# Patient Record
Sex: MALE | Race: WHITE | NOT HISPANIC OR LATINO | Employment: FULL TIME | URBAN - METROPOLITAN AREA
[De-identification: names, ages, dates, MRNs, and addresses within clinical notes are randomized per-mention and may not be internally consistent; named-entity substitution may affect disease eponyms.]

---

## 2018-04-12 ENCOUNTER — OFFICE VISIT CONVERTED (OUTPATIENT)
Dept: ORTHOPEDIC SURGERY | Facility: CLINIC | Age: 43
End: 2018-04-12
Attending: ORTHOPAEDIC SURGERY

## 2018-04-19 ENCOUNTER — OFFICE VISIT CONVERTED (OUTPATIENT)
Dept: ORTHOPEDIC SURGERY | Facility: CLINIC | Age: 43
End: 2018-04-19
Attending: ORTHOPAEDIC SURGERY

## 2018-04-19 ENCOUNTER — CONVERSION ENCOUNTER (OUTPATIENT)
Dept: ORTHOPEDIC SURGERY | Facility: CLINIC | Age: 43
End: 2018-04-19

## 2018-04-25 ENCOUNTER — OFFICE VISIT CONVERTED (OUTPATIENT)
Dept: FAMILY MEDICINE CLINIC | Facility: CLINIC | Age: 43
End: 2018-04-25
Attending: NURSE PRACTITIONER

## 2018-05-08 ENCOUNTER — OFFICE VISIT CONVERTED (OUTPATIENT)
Dept: FAMILY MEDICINE CLINIC | Facility: CLINIC | Age: 43
End: 2018-05-08
Attending: FAMILY MEDICINE

## 2018-05-14 ENCOUNTER — OFFICE VISIT CONVERTED (OUTPATIENT)
Dept: FAMILY MEDICINE CLINIC | Facility: CLINIC | Age: 43
End: 2018-05-14
Attending: NURSE PRACTITIONER

## 2018-09-05 ENCOUNTER — OFFICE VISIT CONVERTED (OUTPATIENT)
Dept: FAMILY MEDICINE CLINIC | Facility: CLINIC | Age: 43
End: 2018-09-05
Attending: NURSE PRACTITIONER

## 2019-03-12 ENCOUNTER — HOSPITAL ENCOUNTER (OUTPATIENT)
Dept: OTHER | Facility: HOSPITAL | Age: 44
Discharge: HOME | End: 2019-03-12
Attending: PSYCHIATRY & NEUROLOGY

## 2019-03-12 LAB
ALBUMIN SERPL-MCNC: 4.4 G/DL (ref 3.5–5)
ALBUMIN/GLOB SERPL: 1.3 {RATIO} (ref 1.4–2.6)
ALP SERPL-CCNC: 116 U/L (ref 53–128)
ALT SERPL-CCNC: 18 U/L (ref 10–40)
ANION GAP SERPL CALC-SCNC: 17 MMOL/L (ref 8–19)
AST SERPL-CCNC: 16 U/L (ref 15–50)
BASOPHILS # BLD AUTO: 0.06 10*3/UL (ref 0–0.2)
BASOPHILS NFR BLD AUTO: 0.6 % (ref 0–3)
BILIRUB SERPL-MCNC: 0.22 MG/DL (ref 0.2–1.3)
BUN SERPL-MCNC: 11 MG/DL (ref 5–25)
BUN/CREAT SERPL: 11 {RATIO} (ref 6–20)
CALCIUM SERPL-MCNC: 10 MG/DL (ref 8.7–10.4)
CHLORIDE SERPL-SCNC: 107 MMOL/L (ref 99–111)
CONV ABS IMM GRAN: 0.02 10*3/UL (ref 0–0.2)
CONV CO2: 25 MMOL/L (ref 22–32)
CONV IMMATURE GRAN: 0.2 % (ref 0–1.8)
CONV TOTAL PROTEIN: 7.8 G/DL (ref 6.3–8.2)
CREAT UR-MCNC: 1.01 MG/DL (ref 0.7–1.2)
DEPRECATED RDW RBC AUTO: 45 FL (ref 35.1–43.9)
EOSINOPHIL # BLD AUTO: 0.28 10*3/UL (ref 0–0.7)
EOSINOPHIL # BLD AUTO: 2.9 % (ref 0–7)
ERYTHROCYTE [DISTWIDTH] IN BLOOD BY AUTOMATED COUNT: 13.2 % (ref 11.6–14.4)
GFR SERPLBLD BASED ON 1.73 SQ M-ARVRAT: >60 ML/MIN/{1.73_M2}
GLOBULIN UR ELPH-MCNC: 3.4 G/DL (ref 2–3.5)
GLUCOSE SERPL-MCNC: 85 MG/DL (ref 70–99)
HBA1C MFR BLD: 15.1 G/DL (ref 14–18)
HCT VFR BLD AUTO: 47.3 % (ref 42–52)
LYMPHOCYTES # BLD AUTO: 3.39 10*3/UL (ref 1–5)
MCH RBC QN AUTO: 29.8 PG (ref 27–31)
MCHC RBC AUTO-ENTMCNC: 31.9 G/DL (ref 33–37)
MCV RBC AUTO: 93.5 FL (ref 80–96)
MONOCYTES # BLD AUTO: 0.86 10*3/UL (ref 0.2–1.2)
MONOCYTES NFR BLD AUTO: 9 % (ref 3–10)
NEUTROPHILS # BLD AUTO: 4.99 10*3/UL (ref 2–8)
NEUTROPHILS NFR BLD AUTO: 52 % (ref 30–85)
NRBC CBCN: 0 % (ref 0–0.7)
OSMOLALITY SERPL CALC.SUM OF ELEC: 297 MOSM/KG (ref 273–304)
PLATELET # BLD AUTO: 390 10*3/UL (ref 130–400)
PMV BLD AUTO: 11.5 FL (ref 9.4–12.4)
POTASSIUM SERPL-SCNC: 5 MMOL/L (ref 3.5–5.3)
RBC # BLD AUTO: 5.06 10*6/UL (ref 4.7–6.1)
SODIUM SERPL-SCNC: 144 MMOL/L (ref 135–147)
VARIANT LYMPHS NFR BLD MANUAL: 35.3 % (ref 20–45)
WBC # BLD AUTO: 9.6 10*3/UL (ref 4.8–10.8)

## 2019-10-30 ENCOUNTER — OFFICE VISIT CONVERTED (OUTPATIENT)
Dept: FAMILY MEDICINE CLINIC | Facility: CLINIC | Age: 44
End: 2019-10-30
Attending: FAMILY MEDICINE

## 2019-12-27 ENCOUNTER — OFFICE VISIT CONVERTED (OUTPATIENT)
Dept: FAMILY MEDICINE CLINIC | Facility: CLINIC | Age: 44
End: 2019-12-27
Attending: NURSE PRACTITIONER

## 2019-12-27 ENCOUNTER — HOSPITAL ENCOUNTER (OUTPATIENT)
Dept: FAMILY MEDICINE CLINIC | Facility: CLINIC | Age: 44
Discharge: HOME | End: 2019-12-27
Attending: NURSE PRACTITIONER

## 2020-01-09 ENCOUNTER — HOSPITAL ENCOUNTER (OUTPATIENT)
Dept: PHYSICAL THERAPY | Facility: CLINIC | Age: 45
Setting detail: RECURRING SERIES
Discharge: HOME | End: 2020-02-05
Attending: NURSE PRACTITIONER

## 2020-01-21 ENCOUNTER — OFFICE VISIT CONVERTED (OUTPATIENT)
Dept: ORTHOPEDIC SURGERY | Facility: CLINIC | Age: 45
End: 2020-01-21
Attending: ORTHOPAEDIC SURGERY

## 2020-06-25 ENCOUNTER — OFFICE VISIT CONVERTED (OUTPATIENT)
Dept: FAMILY MEDICINE CLINIC | Facility: CLINIC | Age: 45
End: 2020-06-25
Attending: NURSE PRACTITIONER

## 2021-03-17 ENCOUNTER — HOSPITAL ENCOUNTER (OUTPATIENT)
Dept: OTHER | Facility: HOSPITAL | Age: 46
Discharge: HOME | End: 2021-03-17
Attending: PSYCHIATRY & NEUROLOGY

## 2021-03-17 LAB
ALBUMIN SERPL-MCNC: 4.4 G/DL (ref 3.5–5)
ALBUMIN/GLOB SERPL: 1.3 {RATIO} (ref 1.4–2.6)
ALP SERPL-CCNC: 109 U/L (ref 53–128)
ALT SERPL-CCNC: 26 U/L (ref 10–40)
ANION GAP SERPL CALC-SCNC: 14 MMOL/L (ref 8–19)
AST SERPL-CCNC: 22 U/L (ref 15–50)
BASOPHILS # BLD AUTO: 0.08 10*3/UL (ref 0–0.2)
BASOPHILS NFR BLD AUTO: 0.6 % (ref 0–3)
BILIRUB SERPL-MCNC: 0.2 MG/DL (ref 0.2–1.3)
BUN SERPL-MCNC: 12 MG/DL (ref 5–25)
BUN/CREAT SERPL: 15 {RATIO} (ref 6–20)
CALCIUM SERPL-MCNC: 9.5 MG/DL (ref 8.7–10.4)
CHLORIDE SERPL-SCNC: 104 MMOL/L (ref 99–111)
CONV ABS IMM GRAN: 0.03 10*3/UL (ref 0–0.2)
CONV CO2: 26 MMOL/L (ref 22–32)
CONV IMMATURE GRAN: 0.2 % (ref 0–1.8)
CONV TOTAL PROTEIN: 7.9 G/DL (ref 6.3–8.2)
CREAT UR-MCNC: 0.81 MG/DL (ref 0.7–1.2)
DEPRECATED RDW RBC AUTO: 41.4 FL (ref 35.1–43.9)
EOSINOPHIL # BLD AUTO: 0.17 10*3/UL (ref 0–0.7)
EOSINOPHIL # BLD AUTO: 1.4 % (ref 0–7)
ERYTHROCYTE [DISTWIDTH] IN BLOOD BY AUTOMATED COUNT: 12.7 % (ref 11.6–14.4)
GFR SERPLBLD BASED ON 1.73 SQ M-ARVRAT: >60 ML/MIN/{1.73_M2}
GLOBULIN UR ELPH-MCNC: 3.5 G/DL (ref 2–3.5)
GLUCOSE SERPL-MCNC: 82 MG/DL (ref 70–99)
HCT VFR BLD AUTO: 43.7 % (ref 42–52)
HGB BLD-MCNC: 14.9 G/DL (ref 14–18)
LYMPHOCYTES # BLD AUTO: 3.06 10*3/UL (ref 1–5)
LYMPHOCYTES NFR BLD AUTO: 24.4 % (ref 20–45)
MCH RBC QN AUTO: 30.1 PG (ref 27–31)
MCHC RBC AUTO-ENTMCNC: 34.1 G/DL (ref 33–37)
MCV RBC AUTO: 88.3 FL (ref 80–96)
MONOCYTES # BLD AUTO: 1 10*3/UL (ref 0.2–1.2)
MONOCYTES NFR BLD AUTO: 8 % (ref 3–10)
NEUTROPHILS # BLD AUTO: 8.22 10*3/UL (ref 2–8)
NEUTROPHILS NFR BLD AUTO: 65.4 % (ref 30–85)
NRBC CBCN: 0 % (ref 0–0.7)
OSMOLALITY SERPL CALC.SUM OF ELEC: 289 MOSM/KG (ref 273–304)
PLATELET # BLD AUTO: 343 10*3/UL (ref 130–400)
PMV BLD AUTO: 10.4 FL (ref 9.4–12.4)
POTASSIUM SERPL-SCNC: 3.8 MMOL/L (ref 3.5–5.3)
RBC # BLD AUTO: 4.95 10*6/UL (ref 4.7–6.1)
SODIUM SERPL-SCNC: 140 MMOL/L (ref 135–147)
WBC # BLD AUTO: 12.56 10*3/UL (ref 4.8–10.8)

## 2021-05-01 ENCOUNTER — OFFICE VISIT CONVERTED (OUTPATIENT)
Dept: FAMILY MEDICINE CLINIC | Facility: CLINIC | Age: 46
End: 2021-05-01
Attending: NURSE PRACTITIONER

## 2021-05-07 ENCOUNTER — CONVERSION ENCOUNTER (OUTPATIENT)
Dept: SURGERY | Facility: CLINIC | Age: 46
End: 2021-05-07

## 2021-05-07 ENCOUNTER — OFFICE VISIT CONVERTED (OUTPATIENT)
Dept: UROLOGY | Facility: CLINIC | Age: 46
End: 2021-05-07
Attending: UROLOGY

## 2021-05-07 LAB
BILIRUB UR QL STRIP: NEGATIVE
COLOR UR: YELLOW
CONV CLARITY OF URINE: CLEAR
CONV PROTEIN IN URINE BY AUTOMATED TEST STRIP: NEGATIVE
CONV UROBILINOGEN IN URINE BY AUTOMATED TEST STRIP: NORMAL
GLUCOSE UR QL: NEGATIVE
HGB UR QL STRIP: NEGATIVE
KETONES UR QL STRIP: NEGATIVE
LEUKOCYTE ESTERASE UR QL STRIP: NEGATIVE
NITRITE UR QL STRIP: NEGATIVE
PH UR STRIP.AUTO: 6 [PH]
SP GR UR: NORMAL

## 2021-05-07 NOTE — PROGRESS NOTES
Progress Note      Patient Name: Corky Gil   Patient ID: [id number]   Sex: Male   YOB: 1975    Referring Provider: Zoe HOPKINS    Visit Date: September 5, 2018    Provider: JW Cortez   Location: [facility]   Location Address: [address] [place] [address]  [id number]   Location Phone: [phone]          Chief Complaint     left eye, blood shot and draining, hurts and itches, started two days ago.       History Of Present Illness  Corky Gil is a 43 year old /White male who presents for evaluation and treatment of:      left eye is blood shot and a little itchy--it is watery--the eye started hurting 2 days ago--was not blood shot at the time of onset of pain, but then as the night progressed it did become blood shot--no blurred vision, double vision, or impaired vision--denies any foreign object to the eye--he does have glasses, but only for reading up close--    The only change that he can think of is that he started wearing a CPAP 4 weeks ago--he has had mild allergy symptoms--he does take OTC Zyrtec--he states he has been sneezing and his nose runs--but mainly in the mornings.       Past Medical History  Disease Name Date Onset Notes   Arthralgia --  --    Convulsive disorder --  --    Seizures --  --    Sleep apnea --  --          Past Surgical History  Procedure Name Date Notes   Vasectomy --  --          Medication List  Name Date Started Instructions   Tegretol 200 mg oral tablet  --    Zyrtec 10 mg oral tablet 04/25/2018 take 1 tablet (10 mg) by oral route once daily         Allergy List  Allergen Name Date Reaction Notes   Dilantin --  --  --    naproxen --  --  --          Family Medical History  Disease Name Relative/Age Notes   Arthrtis / Grandmother (maternal); Grandfather (maternal); Grandmother (paternal); Grandfather (paternal)    Grandfather (maternal)/     Grandfather (paternal)/     Grandmother (maternal)/      Grandmother (paternal)/    Family History Of Breast Cancer / Mother; Grandfather (maternal)    Grandfather (maternal)/     Mother/    Heart Disease / --    Seizures / Grandfather (paternal)    Grandfather (paternal)/          Social History  Finding Status Start/Stop Quantity Notes   Alcohol Former --/-- --  drank alcohol in the past, 8-14 drinks per week   Exercises regularly --  --/-- --  occasionally   Recreational Drug Use Former --/-- --  in the past   Second hand smoke exposure Unknown --/-- --  yes   Tobacco Former --/-- --  current every day smoker, smokes less than 1 pack per day, for 30 years  SMOKER A HALF A PACK TO A WHOLE PACK A DAY   Uses seatbelts --  --/-- --  no         Review of Systems  · Constitutional  o Admits  o : good general health lately  o Denies  o : fever, chills  · Eyes  o Admits  o : discharge from eye, eye discomfort--eye redness on the left  o Denies  o : double vision, impaired vision, blurred vision, changes in vision  · Cardiovascular  o Denies  o : chest pain, palpitations  · Respiratory  o Admits  o : sleep apnea--wears CPAP  o Denies  o : shortness of breath, cough  · Gastrointestinal  o Denies  o : nausea, vomiting  · Integument  o Denies  o : rash, pigmentation changes      Vitals  Date Time BP Position Site L\R Cuff Size HR RR TEMP(F) WT  HT  BMI kg/m2 BSA m2 O2 Sat HC       09/05/2018 09:05 /84 Sitting    80 - R  96.7 174lbs 5oz    97 %           Physical Examination  · Constitutional  o Appearance  o : well developed, well-nourished, in no acute distress  · Eyes  o Conjunctivae  o : subconjunctival hemorrhage present on the left lateral aspect of the eye--the medial aspect is normal. PERRLA  · Respiratory  o Respiratory Effort  o : breathing unlabored  o Auscultation of Lungs  o : clear to ascultation  · Cardiovascular  o Heart  o :   § Auscultation of Heart  § : regular rate and rhythm  o Peripheral Vascular System  o :   § Extremities  § : no  edema  · Musculoskeletal  o General  o :   § General Musculoskeletal  § : Muscle tone, strength, and development grossly normal.  · Skin and Subcutaneous Tissue  o General Inspection  o : no lesions present, no areas of discoloration, skin turgor normal, texture normal  · Neurologic  o Gait and Station  o :   § Gait Screening  § : normal gait  · Psychiatric  o Mood and Affect  o : mood normal, affect appropriate          Assessment  · Subconjunctival hemorrhage of left eye     372.72/H11.32      Plan  · Orders  o ACO-39: Current medications updated and reviewed () - - 09/05/2018  · Instructions  o Patient was educated/instructed on their diagnosis, treatment and medications prior to discharge from the clinic today. It appears he has a subconjunctival hemorrhage of the left eye of unknown etiology--he did start using a CPAP 4 weeks ago, and he is uncertain if this is contributing. He did experience acute eye pain prior to onset of hemorrhage. I advised him to contact his optometrist in [facility] and if he was unable to obtain an appointment there to try [facility] here in [facility] for further evaluation.             Electronically Signed by: JW Cortez -Author on September 5, 2018 09:26:59 AM

## 2021-05-07 NOTE — PROGRESS NOTES
Progress Note      Patient Name: Corky Gil   Patient ID: [id number]   Sex: Male   YOB: 1975    Referring Provider: Yadira Nance DO    Visit Date: October 30, 2019    Provider: Yadira Nance DO   Location: [facility]   Location Address: [address] [place] [address] [place]  [id number]   Location Phone: [phone]          Chief Complaint     Board of education physical       History Of Present Illness  Corky Gil is a 44 year old /White male who presents for evaluation and treatment of:      1.) Board of education physical : The patient presents regarding a board of education physical. Hx of epilepsy. He is controlled on Tegretol. He also presents for tuberculosis screening. No complaints of a cough, fevers, night sweats or dyspnea.       Past Medical History  Disease Name Date Onset Notes   Arthralgia --  --    Convulsive disorder --  --    Seizures --  --    Sleep apnea --  --          Past Surgical History  Procedure Name Date Notes   Vasectomy --  --          Medication List  Name Date Started Instructions   Tegretol 200 mg oral tablet  --          Allergy List  Allergen Name Date Reaction Notes   Dilantin --  --  --    naproxen --  --  --        Allergies Reconciled  Family Medical History  Disease Name Relative/Age Notes   Heart Disease  --    Arthrtis Grandfather (maternal)/  Grandfather (paternal)/  Grandmother (maternal)/  Grandmother (paternal)/   Grandmother (maternal); Grandfather (maternal); Grandmother (paternal); Grandfather (paternal)   Family History Of Breast Cancer Grandfather (maternal)/  Mother/   Mother; Grandfather (maternal)   Seizures Grandfather (paternal)/   Grandfather (paternal)         Social History  Finding Status Start/Stop Quantity Notes   Alcohol Former --/-- --  drank alcohol in the past, 8-14 drinks per week   Exercises regularly --  --/-- --  occasionally   Recreational Drug Use Former --/-- --  in the past   Second hand smoke  "exposure Unknown --/-- --  yes   Tobacco Current every day --/-- 1 1/2 PPD current every day smoker, smokes less than 1 pack per day, for 30 years  SMOKER A HALF A PACK TO A WHOLE PACK A DAY   Uses seatbelts --  --/-- --  no         Immunizations  NameDate Admin Mfg Trade Name Lot Number Route Inj VIS Given VIS Publication   HepA12/02/2018 NE Not Entered  NE NE 12/03/2018    Comments: given at [employer] [facility]   Isnaaxarz80/02/2018 NE Not Entered  NE NE 12/03/2018    Comments: given at [facility]         Review of Systems  · Constitutional  o Denies  o : fatigue, night sweats  · Eyes  o Denies  o : double vision, blurred vision  · HENT  o Denies  o : vertigo, recent head injury  · Cardiovascular  o Denies  o : chest pain, irregular heart beats  · Respiratory  o Denies  o : shortness of breath, productive cough  · Gastrointestinal  o Denies  o : nausea, vomiting  · Genitourinary  o Denies  o : dysuria, urinary retention  · Integument  o Denies  o : hair growth change, new skin lesions  · Neurologic  o Denies  o : altered mental status, seizures  · Musculoskeletal  o Denies  o : joint swelling, limitation of motion  · Endocrine  o Denies  o : cold intolerance, heat intolerance  · Heme-Lymph  o Denies  o : petechiae, lymph node enlargement or tenderness  · Allergic-Immunologic  o Denies  o : frequent illnesses      Vitals  Date Time BP Position Site L\R Cuff Size HR RR TEMP (F) WT  HT  BMI kg/m2 BSA m2 O2 Sat        10/30/2019 03:58 /90 Sitting    94 - R  97.8 168lbs 4oz 5'  4\" 28.88 1.86 98 %          Physical Examination  · Constitutional  o Appearance  o : well developed, well-nourished, in no acute distress  · Head and Face  o HEENT  o : Unremarkable  · Eyes  o Conjunctivae  o : conjunctivae normal  o Pupils and Irises  o : pupils equal and round, pupils reactive to light bilaterally, iris pigmentation normal  · Neck  o Inspection/Palpation  o : supple  o Thyroid  o : no thyromegaly  · Respiratory  o Respiratory " Effort  o : breathing unlabored  o Auscultation of Lungs  o : clear to ascultation  · Cardiovascular  o Heart  o :   § Auscultation of Heart  § : regular rate and rhythm  o Peripheral Vascular System  o :   § Extremities  § : no edema  · Lymphatic  o Neck  o : no lymphadenopathy present  · Musculoskeletal  o General  o :   § General Musculoskeletal  § : No joint swelling or deformity., Muscle tone, strength, and development grossly normal.  · Skin and Subcutaneous Tissue  o General Inspection  o : no lesions present, no areas of discoloration, skin turgor normal, texture normal  · Neurologic  o Mental Status Examination  o :   § Orientation  § : grossly oriented to person, place and time  § Speech/Language  § : communication ability within normal limits, voice quality normal, articulation of speech normal, no evidence of aphasia  o Cranial Nerves  o :   § Optic Nerve  § : vision intact bilaterally  § Oculomotor, Trochlear and Abducens Nerves  § : pupillary response to light brisk, eye movements within normal limits, no ptosis present  § Trigeminal Nerve  § : facial sensation normal bilaterally  § Facial Nerve  § : no facial weakness present  § Vestibuloacoustic Nerve  § : hearing intact bilaterally  § Spinal Accessory Nerve  § : shoulder shrug and sternocleidomastoid strength normal  § Hypoglossal Nerve  § : tongue movements normal  o Motor Examination  o :   § RUE Strength  § : strength normal  § RUE Motor Function  § : tone normal, muscle bulk normal, no abnormal movements  § LUE Strength  § : strength normal  § LUE Motor Function  § : tone normal, muscle bulk normal, no abnormal movements noted  § RLE Strength  § : strength normal  § RLE Motor Function  § : tone normal, no atrophy, no abnormal movements noted  § LLE Strength  § : strength normal  § LLE Motor Function  § : tone normal, no atrophy, no abnormal movements noted  o Reflexes  o :   § RLE  § : patellar reflex 2  § LLE  § : patellar reflex 2  o Gait and  Station  o :   § Gait Screening  § : normal gait  · Psychiatric  o Mood and Affect  o : mood normal, affect appropriate              Assessment  · Administrative encounter     V68.9/Z02.9    A.) Form completed with copy provided to patient.     · Tuberculosis screening     V74.1/Z11.1    A.) [facility] Tuberculosis (TB) Risk Assessment form completed and unremarkable; copy to patient.         Plan  · Orders  o ACO-39: Current medications updated and reviewed () - - 10/30/2019  · Medications  o Medications have been Reconciled  o Transition of Care or Provider Policy  · Instructions  o Patient was educated/instructed on their diagnosis, treatment and medications prior to discharge from the clinic today.  o Follow up as needed.             Electronically Signed by: Yadira Nance DO - on November 5, 2019 10:16:31 AM

## 2021-05-07 NOTE — PROGRESS NOTES
Progress Note      Patient Name: Corky Gil   Patient ID: [id number]   Sex: Male   YOB: 1975    Referring Provider: Zoe ESCALERA    Visit Date: June 25, 2020    Provider: JW Martin   Location: [facility]   Location Address: [address] [place] [address] [place]  [address]   Location Phone: [phone]          Chief Complaint     wants something to stop smoking       History Of Present Illness  Corky Gil is a 44 year old /White male who presents for evaluation and treatment of:      smoking cessation - pt states he has used patches in the past prescribed by JW Erwin - pt was able to stop for 4 months but his grandfather passed away and he started smoking again - he is currently smoking 1/2-1ppd x 2 years this time - pt works at [employer] and works as a  and climbing hills etc gets difficult    Pts wife is also planning to stop smoking       Past Medical History  Disease Name Date Onset Notes   Arthralgia --  --    Convulsive disorder --  --    Seizures --  --    Sleep apnea --  --          Past Surgical History  Procedure Name Date Notes   Vasectomy --  --          Medication List  Name Date Started Instructions   Tegretol 200 mg oral tablet  --          Allergy List  Allergen Name Date Reaction Notes   Dilantin --  --  --    naproxen --  --  --        Allergies Reconciled  Family Medical History  Disease Name Relative/Age Notes   Heart Disease  --    Arthrtis Grandfather (maternal)/  Grandfather (paternal)/  Grandmother (maternal)/  Grandmother (paternal)/   Grandmother (maternal); Grandfather (maternal); Grandmother (paternal); Grandfather (paternal)   Family History Of Breast Cancer Grandfather (maternal)/  Mother/   Mother; Grandfather (maternal)   Seizures Grandfather (paternal)/   Grandfather (paternal)         Social History  Finding Status Start/Stop Quantity Notes   Alcohol Former --/-- --  drank alcohol in the past,  "8-14 drinks per week   Exercises regularly --  --/-- --  occasionally   Recreational Drug Use Former --/-- --  in the past   Second hand smoke exposure Unknown --/-- --  yes   Tobacco Current every day --/-- 1 1/2 PPD current every day smoker, smokes less than 1 pack per day, for 30 years  SMOKER A HALF A PACK TO A WHOLE PACK A DAY   Uses seatbelts --  --/-- --  no         Immunizations  NameDate Admin Mfg Trade Name Lot Number Route Inj VIS Given VIS Publication   HepA12/02/2018 NE Not Entered  NE NE 12/03/2018    Comments: given at [employer]   Rvjvrodrx11/02/2018 NE Not Entered  NE NE 12/03/2018    Comments: given at [employer]         Review of Systems  · Cardiovascular  o Denies  o : chest pain, palpitations  · Respiratory  o Admits  o : shortness of breath  o Denies  o : wheezing, cough      Vitals  Date Time BP Position Site L\R Cuff Size HR RR TEMP (F) WT  HT  BMI kg/m2 BSA m2 O2 Sat        06/25/2020 03:27 /84 Sitting    102 - R  97.3 166lbs 16oz 5'  4\" 28.67 1.85 96 %          Physical Examination  · Constitutional  o Appearance  o : well developed, well-nourished, in no acute distress  · Eyes  o Conjunctivae  o : conjunctivae normal  o Pupils and Irises  o : pupils equal and round, pupils reactive to light bilaterally  · Neck  o Inspection/Palpation  o : supple  o Thyroid  o : no thyromegaly  · Respiratory  o Respiratory Effort  o : breathing unlabored  o Auscultation of Lungs  o : clear to ascultation  · Cardiovascular  o Heart  o :   § Auscultation of Heart  § : regular rate and rhythm  o Peripheral Vascular System  o :   § Extremities  § : no edema  · Lymphatic  o Neck  o : no lymphadenopathy present  · Musculoskeletal  o General  o :   § General Musculoskeletal  § : No joint swelling or deformity. Muscle tone, strength, and development grossly normal.  · Skin and Subcutaneous Tissue  o General Inspection  o : NL tone  · Neurologic  o Gait and Station  o :   § Gait Screening  § : normal " gait  · Psychiatric  o Mood and Affect  o : mood normal, affect appropriate          Assessment  · Tobacco abuse counseling       Tobacco abuse counseling     V65.42/Z71.6    Problems Reconciled  Plan  · Orders  o Smoking cessation counseling, 3-10 minutes [facility] ([id number]) - V65.42/Z71.6 - 06/25/2020  o ACO-17: Screened for tobacco use AND received tobacco cessation intervention (4004F) - V65.42/Z71.6 - 06/25/2020  o ACO-17: Screened for tobacco use AND received tobacco cessation intervention (4004F) - - 06/25/2020  o ACO-39: Current medications updated and reviewed () - - 06/25/2020  · Medications  o Nicoderm CQ 21 mg/24 hr transdermal patch 24 hour   SIG: apply 1 patch (21 mg) by transdermal route once daily for 4 weeks   DISP: (28) patches with 0 refills  Prescribed on 06/25/2020     o Nicoderm CQ 14 mg/24 hr transdermal patch 24 hour   SIG: apply 1 patch (14 mg) by transdermal route once daily for 2 weeks   DISP: (14) patches with 0 refills  Prescribed on 06/25/2020     o Nicoderm CQ 7 mg/24 hr transdermal patch 24 hour   SIG: apply 1 patch (7 mg) by transdermal route once daily for 2 weeks   DISP: (14) patches with 0 refills  Prescribed on 06/25/2020     o Medications have been Reconciled  o Transition of Care or Provider Policy  · Instructions  o Tobacco and smoking cessation counseling for more than 3 minutes was completed. Start use of patches, avoid smoking with patches. Start with 21mg and then decrease to 14mg then to 7mg as directed.   o Take all medications as prescribed/directed.  o Patient was educated/instructed on their diagnosis, treatment and medications prior to discharge from the clinic today.  · Disposition  o Follow up as needed.            Electronically Signed by: JW Martin -Author on June 25, 2020 04:20:23 PM

## 2021-05-07 NOTE — PROGRESS NOTES
Progress Note      Patient Name: Corky Gil   Patient ID: [id number]   Sex: Male   YOB: 1975    Referring Provider: Zoe HOPKINS    Visit Date: May 14, 2018    Provider: JW Cortez   Location: [facility]   Location Address: [address] [place] [address]  [id number]   Location Phone: [phone]          Chief Complaint     poison ivy       History Of Present Illness  Corky Gil is a 42 year old /White male who presents for evaluation and treatment of:      poison ivy--has had for a week or so--he has been using calamine lotion, but it is not improving--he is [employer], so he is outside a lot. He usually gets it once to twice per year. He is requesting a steroid shot.       Past Medical History  Disease Name Date Onset Notes   Arthralgia --  --    Convulsive disorder --  --    Seizures --  --    Sleep apnea --  --          Past Surgical History  Procedure Name Date Notes   Vasectomy --  --          Medication List  Name Date Started Instructions   hydrochlorothiazide 12.5 mg oral tablet 04/25/2018 take 1 tablet (12.5 mg) by oral route once daily PRN edema   nicotine 21 mg/24 hr transdermal patch 24 hour 04/25/2018 apply 1 patch (21 mg) by transdermal route once daily and remove at bedtime   Tegretol 200 mg oral tablet  --    Zyrtec 10 mg oral tablet 04/25/2018 take 1 tablet (10 mg) by oral route once daily         Allergy List  Allergen Name Date Reaction Notes   Dilantin --  --  --    naproxen --  --  --          Family Medical History  Disease Name Relative/Age Notes   Arthrtis / Grandmother (maternal); Grandfather (maternal); Grandmother (paternal); Grandfather (paternal)    Grandfather (maternal)/     Grandfather (paternal)/     Grandmother (maternal)/     Grandmother (paternal)/    Family History Of Breast Cancer / Mother; Grandfather (maternal)    Grandfather (maternal)/     Mother/    Heart Disease / --    Seizures /  Grandfather (paternal)    Grandfather (paternal)/          Social History  Finding Status Start/Stop Quantity Notes   Alcohol Former --/-- --  drank alcohol in the past, 8-14 drinks per week   Exercises regularly --  --/-- --  occasionally   Recreational Drug Use Former --/-- --  in the past   Second hand smoke exposure Unknown --/-- --  yes   Tobacco Former --/-- --  current every day smoker, smokes less than 1 pack per day, for 30 years  SMOKER A HALF A PACK TO A WHOLE PACK A DAY   Uses seatbelts --  --/-- --  no         Review of Systems  · Constitutional  o Admits  o : good general health lately  · Integument  o Admits  o : rash, itching      Vitals  Date Time BP Position Site L\R Cuff Size HR RR TEMP(F) WT  HT  BMI kg/m2 BSA m2 O2 Sat        05/14/2018 09:51 /90 Sitting    91 - R  97 173lbs 0oz    97 %           Physical Examination  · Constitutional  o Appearance  o : well developed, well-nourished, in no acute distress  · Respiratory  o Respiratory Effort  o : breathing unlabored  o Auscultation of Lungs  o : clear to ascultation  · Cardiovascular  o Heart  o :   § Auscultation of Heart  § : regular rate and rhythm  o Peripheral Vascular System  o :   § Extremities  § : no edema  · Musculoskeletal  o General  o :   § General Musculoskeletal  § : No obvious joint swelling or deformity. Muscle tone, strength, and development grossly normal.  · Skin and Subcutaneous Tissue  o General Inspection  o : on the right shin, just below the knee there are convalescing blisters, as well as on the right anterior and posterior forearm and upper arm--they are not weeping, and are in linear formation, especially on the right upper extremity  · Neurologic  o Gait and Station  o :   § Gait Screening  § : normal gait  · Psychiatric  o Mood and Affect  o : mood normal, affect appropriate              Assessment  · Poison ivy dermatitis     692.6/L23.7      Plan  · Orders  o ACO-39: Current medications updated and  reviewed () - - 05/14/2018  o Solu-Medrol Injection 40mg (-7) - 692.6/L23.7 - 05/14/2018   Injection - Solu-Medrol 40mg; Dose: 1mL; Site: Not Entered; Route: intramuscular; Date: 05/14/2018 10:56:36; Exp: 06/01/2019; Lot: [id number]; Mfg: [employer]; TradeName: methylprednisolone sod suc(PF); Location: [facility]; Administered By: Claudia Haq MA; Comment: ndc [id number]  o IM - Injection Fee [facility] ([id number]) - 692.6/L23.7 - 05/14/2018  · Medications  o triamcinolone acetonide 0.1 % topical cream   SIG: apply a thin layer to the affected area(s) by topical route 3 times per day as needed for itching   DISP: (1) 30 gm tube with 0 refills  Prescribed on 05/14/2018     · Instructions  o Patient was educated/instructed on their diagnosis, treatment and medications prior to discharge from the clinic today.  · Disposition  o Call or Return if symptoms worsen or persist.            Electronically Signed by: JW Cortez -Author on May 14, 2018 02:50:01 PM

## 2021-05-07 NOTE — PROGRESS NOTES
Progress Note      Patient Name: Corky Gil   Patient ID: [id number]   Sex: Male   YOB: 1975    Referring Provider: Zoe HOPKINS    Visit Date: May 8, 2018    Provider: Ernesto Lopez MD   Location: [facility], [place]   Location Address: [address] [place] [address]  [id number]   Location Phone: [phone]          Chief Complaint     patient here to f/u from labs       History Of Present Illness  Corky Gil is a 42 year old /White male who presents for evaluation and treatment of:      pt has been dieting and exercising  pt has quit smoking  discussed labs  hyperlipidemia- uncontrolled       Past Medical History  Disease Name Date Onset Notes   Arthralgia --  --    Convulsive disorder --  --    Seizures --  --    Sleep apnea --  --          Past Surgical History  Procedure Name Date Notes   Vasectomy --  --          Medication List  Name Date Started Instructions   hydrochlorothiazide 12.5 mg oral tablet 04/25/2018 take 1 tablet (12.5 mg) by oral route once daily PRN edema   nicotine 21 mg/24 hr transdermal patch 24 hour 04/25/2018 apply 1 patch (21 mg) by transdermal route once daily and remove at bedtime   Tegretol 200 mg oral tablet  --    Zyrtec 10 mg oral tablet 04/25/2018 take 1 tablet (10 mg) by oral route once daily         Allergy List  Allergen Name Date Reaction Notes   Dilantin --  --  --    naproxen --  --  --          Family Medical History  Disease Name Relative/Age Notes   Arthrtis / Grandmother (maternal); Grandfather (maternal); Grandmother (paternal); Grandfather (paternal)    Grandfather (maternal)/     Grandfather (paternal)/     Grandmother (maternal)/     Grandmother (paternal)/    Family History Of Breast Cancer / Mother; Grandfather (maternal)    Grandfather (maternal)/     Mother/    Heart Disease / --    Seizures / Grandfather (paternal)    Grandfather (paternal)/          Social History  Finding Status Start/Stop  Quantity Notes   Alcohol Former --/-- --  drank alcohol in the past, 8-14 drinks per week   Exercises regularly --  --/-- --  occasionally   Recreational Drug Use Former --/-- --  in the past   Second hand smoke exposure Unknown --/-- --  yes   Tobacco Former --/-- --  current every day smoker, smokes less than 1 pack per day, for 30 years  SMOKER A HALF A PACK TO A WHOLE PACK A DAY   Uses seatbelts --  --/-- --  no         Review of Systems  · Constitutional  o Denies  o : fatigue, fever  · Cardiovascular  o Denies  o : chest pain, palpitations  · Respiratory  o Denies  o : shortness of breath, cough  · Gastrointestinal  o Denies  o : nausea, vomiting, diarrhea  · Psychiatric  o Denies  o : anxiety, depression      Vitals  Date Time BP Position Site L\R Cuff Size HR RR TEMP(F) WT  HT  BMI kg/m2 BSA m2 O2 Sat HC       05/08/2018 12:33 /100 Sitting    95 - R  97.2 173lbs 0oz    96 %           Physical Examination  · Constitutional  o Appearance  o : well developed, well-nourished, in no acute distress  · Respiratory  o Respiratory Effort  o : breathing unlabored  o Auscultation of Lungs  o : clear to ascultation  · Cardiovascular  o Heart  o :   § Auscultation of Heart  § : regular rate and rhythm  · Gastrointestinal  o Abdomen  o : soft, non-tender, non-distended, + bowel sounds, no hepatosplenomegaly, no masses palpated  · Musculoskeletal  o General  o :   § General Musculoskeletal  § : No joint swelling or deformity., Muscle tone, strength, and development grossly normal.  · Neurologic  o Mental Status Examination  o :   § Orientation  § : grossly oriented to person, place and time  o Gait and Station  o :   § Gait Screening  § : normal gait              Assessment  · Essential hypertension     401.9/I10  · Hyperlipidemia     272.4/E78.5      Plan  · Orders  o ACO-39: Current medications updated and reviewed () - - 05/08/2018  · Medications  o amoxicillin 875 mg oral tablet   SIG: take 1 tablet (875  mg) by oral route every 12 hours for 10 days   DISP: (20) tablets with 0 refills  Discontinued on 05/08/2018     · Instructions  o Advised that cheeses and other sources of dairy fats, animal fats, fast food, and the extras (candy, pasteries, pies, doughnuts and cookies) all contain LDL raising nutrients. Advised to increase fruits, vegetables, whole grains, and to monitor portion sizes.   o Handouts were given to patient: high cholesterol.  o Take all medications as prescribed/directed.  o Patient was educated/instructed on their diagnosis, treatment and medications prior to discharge from the clinic today.  o will recheck lipids in 3 months.  o pt will continue to monitor BP and if not better in 1 week, will treat. Pt had more caffeine to day and he said he will not do that again.            Electronically Signed by: Ernesto Lopez MD -Author on May 8, 2018 01:09:16 PM

## 2021-05-07 NOTE — PROGRESS NOTES
Progress Note      Patient Name: Corky Gil   Patient ID: [id number]   Sex: Male   YOB: 1975    Referring Provider: Zoe HOPKINS    Visit Date: December 27, 2019    Provider: JW Martin   Location: [facility]   Location Address: [address] [place] [address] [place]  [id number]   Location Phone: [phone]          Chief Complaint     work comp, [place], slipped on a rock, fell,  and felt a pop in left ankle, went to ER on 12-20       History Of Present Illness  Corky Gil is a 44 year old /White male who presents for evaluation and treatment of:      workman's comp from 7 days ago - he was at work coming [place] of a [place]vine carrying a pipe and snake and slipped on a rock and twisted right ankle and felt and heard a pop in the ankle - went to [facility] and had x-rays with no fractures -   He was prescribed Hydrocodone for 3 days  pain is currently to the anterior ankle around to the lateral side - denies bruising but admits to swelling - pain is worse with lateral and medial movements of the ankle       Past Medical History  Disease Name Date Onset Notes   Arthralgia --  --    Convulsive disorder --  --    Seizures --  --    Sleep apnea --  --          Past Surgical History  Procedure Name Date Notes   Vasectomy --  --          Medication List  Name Date Started Instructions   Tegretol 200 mg oral tablet  --          Allergy List  Allergen Name Date Reaction Notes   Dilantin --  --  --    naproxen --  --  --        Allergies Reconciled  Family Medical History  Disease Name Relative/Age Notes   Heart Disease  --    Arthrtis Grandfather (maternal)/  Grandfather (paternal)/  Grandmother (maternal)/  Grandmother (paternal)/   Grandmother (maternal); Grandfather (maternal); Grandmother (paternal); Grandfather (paternal)   Family History Of Breast Cancer Grandfather (maternal)/  Mother/   Mother; Grandfather (maternal)   Seizures  "Grandfather (paternal)/   Grandfather (paternal)         Social History  Finding Status Start/Stop Quantity Notes   Alcohol Former --/-- --  drank alcohol in the past, 8-14 drinks per week   Exercises regularly --  --/-- --  occasionally   Recreational Drug Use Former --/-- --  in the past   Second hand smoke exposure Unknown --/-- --  yes   Tobacco Current every day --/-- 1 1/2 P[employer] current every day smoker, smokes less than 1 pack per day, for 30 years  SMOKER A HALF A PACK TO A WHOLE PACK A DAY   Uses seatbelts --  --/-- --  no         Immunizations  NameDate Admin Mfg Trade Name Lot Number Route Inj VIS Given VIS Publication   HepA12/02/2018 NE Not Entered  NE NE 12/03/2018    Comments: given at [employer]   Ouaappehn50/02/2018 NE Not Entered  NE NE 12/03/2018    Comments: given at [employer]         Review of Systems  · Cardiovascular  o Denies  o : chest pain, palpitations  · Respiratory  o Denies  o : shortness of breath, wheezing  · Musculoskeletal  o * See HPI      Vitals  Date Time BP Position Site L\R Cuff Size HR RR TEMP (F) WT  HT  BMI kg/m2 BSA m2 O2 Sat        12/27/2019 10:35 /80 Sitting    86 - R  97.2 165lbs 4oz 5'  4\" 28.36 1.84 97 %          Physical Examination  · Constitutional  o Appearance  o : well developed, well-nourished, in no acute distress  · Eyes  o Conjunctivae  o : conjunctivae normal  o Pupils and Irises  o : pupils equal and round, pupils reactive to light bilaterally  · Respiratory  o Respiratory Effort  o : breathing unlabored  o Auscultation of Lungs  o : clear to ascultation  · Cardiovascular  o Heart  o :   § Auscultation of Heart  § : regular rate and rhythm  o Peripheral Vascular System  o :   § Extremities  § : no edema  · Musculoskeletal  o General  o :   § General Musculoskeletal  § : Trace edema of the right lateral ankle, limited ROM to medial and lateral movement. No joint deformity. Muscle tone, strength, and development grossly normal.  · Skin and Subcutaneous " Tissue  o General Inspection  o : NL tone  · Neurologic  o Gait and Station  o :   § Gait Screening  § : normal gait  · Psychiatric  o Mood and Affect  o : mood normal, affect appropriate              Assessment  · Ankle pain, right     719.47/M25.571    Problems Reconciled  Plan  · Orders  o ACO-17: Screened for tobacco use AND received tobacco cessation intervention (4004F) - - 12/27/2019  o ACO-18: Negative screen for clinical depression using a standardized tool () - - 12/27/2019  o ACO-39: Current medications updated and reviewed () - - 12/27/2019  o Ankle (Right) [facility] ([id number]) - 719.47/M25.571 - 12/27/2019  o MRI ankle right wo contrast ([id number]) - 719.47/M25.571 - 12/27/2019  · Medications  o Medications have been Reconciled  o Transition of Care or Provider Policy  · Instructions  o Patient was educated/instructed on their diagnosis, treatment and medications prior to discharge from the clinic today.  o MRI ordered - pt to take 500mg Tylenol with 400mg of Ibuprofen every 6-8 hours for pain and swelling. continue to wear air cast and elevate and ice - may return to work pending MRI.   · Disposition  o Follow up as needed.            Electronically Signed by: JW Martin -Author on December 27, 2019 12:28:51 PM

## 2021-05-07 NOTE — PROGRESS NOTES
Progress Note      Patient Name: Corky Gil   Patient ID: [id number]   Sex: Male   YOB: 1975        Visit Date: April 25, 2018    Provider: SANDEEP Fuller   Location: [facility]   Location Address: [address] [place] [address]  [id number]   Location Phone: [phone]          Chief Complaint  · Adult General Male Physical Exam     PATIENT IS HERE FOR A YEARLY PHYSICAL.  HE DID HAVE HIS FLU SHOT AT [employer].  HE WOULD LIKE TO STOP SMOKING AND WANTS TO ASK YOU ABOUT THAT.      EYE TEST:    COLOR - PASSED  RIGHT - 20/25  LEFT - 20/20  BOTH - 20/15       History Of Present Illness  Corky Gil is a 42 year old /White male who presents for evaluation and treatment of:      PATIENT IS HERE FOR A YEARLY PHYSICAL.    HE DID HAVE HIS FLU SHOT AT [employer].    HE WOULD LIKE TO STOP SMOKING AND WANTS TO ASK YOU ABOUT THAT.--pt used patches in the past did help and curve the craving good-last admission was 2017--was for CP-was at [place] and all was good--did a stress test and labs and EKG and all was good     and then adds sinus infection s/s for about 1 week    then hands and feet been swelling lately approx. couple yrs--and he and the family are going to [place] this summer and if hands and feet get too swelled has to sit down and rest-- and BP a little elevated as well today-    sees neurologist DR SAM Bonilla and DR Barcenas for the right knee     and needs an epi-pen for bee stings --ended up in the ER dept last yr [facility] due to a bee sting swelled up--swelled his face up and bee stung him just below the eye--    needs zytec for allergies    no SZ in the last 15 yrs --sees DR SAM Bonilla     and pt very tired and needs to F/U with his sleep apnea MD not using the CPAP     EYE TEST:    COLOR - PASSED  RIGHT - 20/25  LEFT - 20/20  BOTH - 20/15       Past Medical History  Disease Name Date Onset Notes   Arthralgia --  --    Convulsive disorder --  --           Past Surgical History  Procedure Name Date Notes   Vasectomy --  --          Medication List  Name Date Started Instructions   Tegretol 200 mg oral tablet  --          Allergy List  Allergen Name Date Reaction Notes   Dilantin --  --  --    naproxen --  --  --          Family Medical History  Disease Name Relative/Age Notes   Heart Disease / --          Social History  Finding Status Start/Stop Quantity Notes   Tobacco --  --/-- --  SMOKER A HALF A PACK TO A WHOLE PACK A DAY         Review of Systems  · Constitutional  o Denies  o : chills, fever, fatigue, night sweats, weight loss, weight gain, loss of appetite  · Eyes  o Denies  o : double vision, blurred vision  · HENT  o Admits  o : sinus pain, nasal discharge, postnasal drip  o Denies  o : vertigo, recent head injury, nasal congestion  · Cardiovascular  o Denies  o : chest pain, dyspnea on exertion, lower extremity edema  · Respiratory  o Denies  o : shortness of breath, wheezing, cough  · Gastrointestinal  o Denies  o : nausea, vomiting, diarrhea, constipation, reflux, abdominal pain, blood in stools  · Genitourinary  o Denies  o : urgency, frequency, dysuria, nocturia, hematuria, incontinence, difficulty voiding, urinary hesitancy, decreased stream, post-void dribbling, decreased libido  · Integument  o Denies  o : rash  · Neurologic  o Denies  o : headache  · Musculoskeletal  o Admits  o : joint pain, knee pain  o Denies  o : joint swelling, limitation of motion  · Endocrine  o Denies  o : cold intolerance, heat intolerance  · Psychiatric  o Denies  o : anxiety, depression, suicidal ideation, homicidal ideation  · Heme-Lymph  o Denies  o : petechiae, lymph node enlargement or tenderness  · Allergic-Immunologic  o Admits  o : sinus allergy symptoms  o Denies  o : frequent illnesses      Vitals  Date Time BP Position Site L\R Cuff Size HR RR TEMP(F) WT  HT  BMI kg/m2 BSA m2 O2 Sat HC       04/25/2018 02:52 /84 Sitting    102 - R  97.2 167lbs 2oz  "5'  4\" 28.69 1.85 98 %           Physical Examination  · Constitutional  o Appearance  o : active, well developed, well-nourished, alert, well-tended appearance  · Eyes  o Conjunctivae  o : conjunctiva normal, no exudates present  o Pupils and Irises  o : pupils equal and round, pupils reactive to light bilaterally, symmetric light reflex, normal accommodation bilaterally  o Eyelids/Ocular Adnexae  o : eyelid appearance normal  · Ears, Nose, Mouth and Throat  o Ears  o :   § External Ears  § : external auditory canals normal  § Otoscopic Examination  § : tympanic membrane normal bilaterally, no tympanic membrane perforations present, no PE tubes present  o Nose  o :   § External Nose  § : appearance normal(+) ethmoid sinus tenderness   § Intranasal Exam  § : mucosa within normal limits, vestibules normal, no intranasal lesions present, septum midline  § Nasopharynx  § : no lesions or inflammation  o Oral Cavity  o :   § Oral Mucosa  § : mucous membranes moist and normal  § Lips  § : lip appearance normal  § Teeth  § : normal dentition for age  § Tongue  § : tongue moist and normal  § Palate  § : hard palate normal, soft palate normal  · Respiratory  o Respiratory Effort  o : breathing unlabored  o Inspection of Chest  o : normal appearance  o Auscultation of Lungs  o : normal breath sounds bilaterally  · Cardiovascular  o Heart  o :   § Auscultation of Heart  § : regular rate, normal rhythm, no murmurs present  o Peripheral Vascular System  o :   § Extremities  § : no cyanosis--very mild edema or swelling to the fingers and ankles   · Gastrointestinal  o Abdominal Examination  o : soft and nontender to palpation, nondistended, no masses present, normal bowel sounds  o Liver and spleen  o : no hepatomegaly, spleen not palpable  · Genitourinary  o Penis  o : normal circumcised penis, normal development for age, no coronal adhesions  · Lymphatic  o Neck  o : no lymphadenopathy present  · Musculoskeletal  o Right Upper " Extremity  o : normal range of motion  o Left Upper Extremity  o : normal range of motion  o Right Lower Extremity  o : normal range of motion--but the right knee tender  o Left Lower Extremity  o : normal range of motion  o Trunk/Spine  o : no scoliosis  · Skin and Subcutaneous Tissue  o General Inspection  o : no rashes present, no lesions present  o Digits and Nails  o : no clubbing, cyanosis, or edema present  · Neurologic  o Mental Status Examination  o :   § Speech/Language  § : speech development normal for age, level of language comprehension normal for age  § Attention  § : attention normal  o Motor Examination  o :   § RUE Strength  § : strength normal  § RUE Motor Function  § : tone normal  § LUE Strength  § : strength normal  § LUE Motor Function  § : tone normal  § RLE Strength  § : strength normal  § RLE Motor Function  § : tone normal  § LLE Strength  § : strength normal  § LLE Motor Function  § : tone normal  o Gait and Station  o : normal gait  · Psychiatric  o Mood and Affect  o : normal mood, appropriate affect              Assessment  · General Medical Exam, Adult (CPE)     V70.0/Z00.00  · Allergic rhinitis due to allergen     477.9/J30.9  · Sinusitis, acute     461.9/J01.90  · Tobacco abuse counseling       Tobacco abuse counseling     V65.42/Z71.6  · AMARILIS (obstructive sleep apnea)     327.23/G47.33  · Bee allergy status     V15.06/Z91.030  · Edema     782.3/R60.9  · Elevated blood pressure reading in office without diagnosis of hypertension     796.2/R03.0  · BMI 28.0-28.9,adult     V85.24/Z68.28      Plan  · Orders  o Smoking cessation counseling, 3-10 minutes [facility] ([address]109) - V65.42/Z71.6 - 04/25/2018  o ACO-17: Screened for tobacco use AND received tobacco cessation intervention (4004F) - V65.42/Z71.6 - 04/25/2018  o CBC with Auto Diff [facility] (28954) - V70.0/Z00.00, 327.23/G47.33, 782.3/R60.9, 796.2/R03.0 - 04/25/2018  o CMP [facility] (03792) - V70.0/Z00.00, 327.23/G47.33, 782.3/R60.9, 796.2/R03.0 -  04/25/2018  o Lipid Panel [facility] (90422) - V70.0/Z00.00, 327.23/G47.33, 782.3/R60.9, 796.2/R03.0 - 04/25/2018  o Thyroid Profile (THYII) - V70.0/Z00.00, 327.23/G47.33, 782.3/R60.9, 796.2/R03.0 - 04/25/2018  o Urinalysis with Reflex Microscopy if abnormal ([facility]) (27920) - V70.0/Z00.00, 327.23/G47.33, 782.3/R60.9, 796.2/R03.0 - 04/25/2018  o ACO-39: Current medications updated and reviewed () - - 04/25/2018  o ACO-18: Negative screen for clinical depression using a standardized tool () - - 04/25/2018  o Influenza immunization was ordered or administered () - - 04/25/2018  o Sleep Disorder Clinic Consultation (SLEEP) - 327.23/G47.33 - 04/25/2018   used to be on CPAP--but pt states needs new testing and been off the CPAP   · Medications  o hydrochlorothiazide 12.5 mg oral tablet   SIG: take 1 tablet (12.5 mg) by oral route once daily PRN edema   DISP: (30) tablets with 2 refills  Prescribed on 04/25/2018     o nicotine 21 mg/24 hr transdermal patch 24 hour   SIG: apply 1 patch (21 mg) by transdermal route once daily and remove at bedtime   DISP: (30) patches with 1 refills  Prescribed on 04/25/2018     o amoxicillin 875 mg oral tablet   SIG: take 1 tablet (875 mg) by oral route every 12 hours for 10 days   DISP: (20) tablets with 0 refills  Prescribed on 04/25/2018     o Zyrtec 10 mg oral tablet   SIG: take 1 tablet (10 mg) by oral route once daily   DISP: (30) tablets with 5 refills  Prescribed on 04/25/2018     · Instructions  o Tobacco and smoking cessation counseling for more than 3 minutes was completed.  o Take all medications as prescribed/directed.  o Patient instructed/educated on their diet and exercise program.  o Patient was educated/instructed on their diagnosis, treatment and medications prior to discharge from the clinic today.  o Patient counseled to stop smoking.  o Patient counseled to reduce calorie intake.  o Patient was instructed to exercise regularly.  o Patient instructed to seek  medical attention urgently for new or worsening symptoms.  o Call the office with any concerns or questions.  o Chronic conditions reviewed and taken into consideration for today's treatment plan.  · Disposition  o Call or Return if symptoms worsen or persist.            Electronically Signed by: SANDEEP Fuller -Author on April 25, 2018 03:51:44 PM

## 2021-05-07 NOTE — PROGRESS NOTES
Progress Note      Patient Name: Corky Gil   Patient ID: [id number]   Sex: Male   YOB: 1975    Referring Provider: Zoe ESCALERA    Visit Date: May 1, 2021    Provider: JW Cortez   Location: [facility]   Location Address: [address] [place] [address]   LOU[place] Young  [id number]   Location Phone: [phone]          Chief Complaint     poison ivy, started about 1 week ago       History Of Present Illness  Corky Gil is a 45 year old /White male who presents for evaluation and treatment of:      one week history of 'poison ivy' - started about a week ago and is getting worse despite treatment with OTC products -  has even tried painting clear nail polish on the rash - states it is on the BUE, torso and genitals. works at [employer] in [employer] and 'it is inevitable' that he gets annually       Past Medical History  Disease Name Date Onset Notes   Arthralgia --  --    Convulsive disorder --  --    Seizures --  --    Sleep apnea --  --          Past Surgical History  Procedure Name Date Notes   Vasectomy --  --          Medication List  Name Date Started Instructions   Tegretol 200 mg oral tablet  --          Allergy List  Allergen Name Date Reaction Notes   Dilantin --  --  --    naproxen --  --  --        Allergies Reconciled  Family Medical History  Disease Name Relative/Age Notes   Heart Disease  --    Arthrtis Grandfather (maternal)/  Grandfather (paternal)/  Grandmother (maternal)/  Grandmother (paternal)/   Grandmother (maternal); Grandfather (maternal); Grandmother (paternal); Grandfather (paternal)   Family History Of Breast Cancer Grandfather (maternal)/  Mother/   Mother; Grandfather (maternal)   Seizures Grandfather (paternal)/   Grandfather (paternal)         Social History  Finding Status Start/Stop Quantity Notes   Alcohol Former --/-- --  drank alcohol in the past, 8-14 drinks per week   Exercises regularly --  --/-- --  occasionally  "  Recreational Drug Use Former --/-- --  in the past   Second hand smoke exposure Unknown --/-- --  yes   Tobacco Current every day --/-- 1 1/2 PPD current every day smoker, smokes less than 1 pack per day, for 30 years  SMOKER A HALF A PACK TO A WHOLE PACK A DAY   Uses seatbelts --  --/-- --  no         Immunizations  NameDate Admin Mfg Trade Name Lot Number Route Inj VIS Given VIS Publication   HepA12/02/2018 NE Not Entered  NE NE 12/03/2018    Comments: given at [facility]   Ofsajmlez44/02/2018 NE Not Entered  NE NE 12/03/2018    Comments: given at [facility]         Review of Systems  · Constitutional  o Admits  o : good general health lately  · Integument  o Admits  o : rash, itching      Vitals  Date Time BP Position Site L\R Cuff Size HR RR TEMP (F) WT  HT  BMI kg/m2 BSA m2 O2 Sat FR L/min FiO2        05/01/2021 08:36 /80 Sitting    72 - R  97.1 168lbs 6oz 5'  4\" 28.9 1.86 97 %  21%          Physical Examination  · Constitutional  o Appearance  o : well-nourished, well developed, alert, in no acute distress, well-tended appearance, no obvious deformities present  · Head and Face  o HEENT  o : Unremarkable - wearing a mask  · Respiratory  o Respiratory Effort  o : breathing unlabored  o Auscultation of Lungs  o : clear to auscultation  · Cardiovascular  o Heart  o :   § Auscultation of Heart  § : regular rate, normal rhythm, no murmurs present  o Peripheral Vascular System  o :   § Extremities  § : no edema  · Musculoskeletal  o General  o :   § General Musculoskeletal  § : Muscle tone, strength, and development grossly normal.  · Skin and Subcutaneous Tissue  o General Inspection  o : erythematous and vesicular-type rash on the BUEs, anterolateral aspect of the torso on the left, and in the groin region - some areas do appear to be drying up, but there are new vesicles as well. There is evidence of excoriation present.   · Neurologic  o Gait and Station  o :   § Gait Screening  § : normal " gait  · Psychiatric  o Mood and Affect  o : mood normal, affect appropriate          Assessment  · Contact dermatitis     692.9/L25.9      Plan  · Orders  o ACO-17: Screened for tobacco use AND received tobacco cessation intervention (4004F) - - 2021  o ACO-39: Current medications updated and reviewed (1159F, ) - - 2021  o IM - Injection Fee (78446) - 692.9/L25.9 - 2021  o 2.00 - Dexamethasone Injection 8mg (-9) - 692.9/L25.9 - 2021   [id number] Left Gluteus PMB  · Medications  o prednisone 10 mg oral tablet   SI po QD x3, 3 po QD x3, 2 po QD x3, 1 po QD x3, and 1/2 po QD x3   DISP: (32) Tablet with 0 refills  Prescribed on 2021     o Medications have been Reconciled  o Transition of Care or Provider Policy  · Instructions  o Patient was educated/instructed on their diagnosis, treatment and medications prior to discharge from the clinic today. Will do the IM injection today and initiate oral taper as well due to the amount of dermatitis noted on exam, and location with genital involvement - continue OTC treatment as well such as Calamine Lotion and Domeboro soaks. Also discussed ways to help limit exposure/contact. Voiced understanding. Follow up if no improvement.             Electronically Signed by: JW Cortez -Author on May 1, 2021 09:37:54 AM

## 2021-05-09 VITALS
HEIGHT: 64 IN | DIASTOLIC BLOOD PRESSURE: 84 MMHG | BODY MASS INDEX: 28.53 KG/M2 | WEIGHT: 167.12 LBS | SYSTOLIC BLOOD PRESSURE: 142 MMHG | TEMPERATURE: 97.2 F | HEART RATE: 102 BPM | OXYGEN SATURATION: 98 %

## 2021-05-09 VITALS
BODY MASS INDEX: 28.21 KG/M2 | SYSTOLIC BLOOD PRESSURE: 130 MMHG | DIASTOLIC BLOOD PRESSURE: 80 MMHG | OXYGEN SATURATION: 97 % | HEIGHT: 64 IN | HEART RATE: 86 BPM | WEIGHT: 165.25 LBS | TEMPERATURE: 97.2 F

## 2021-05-09 VITALS
SYSTOLIC BLOOD PRESSURE: 120 MMHG | TEMPERATURE: 96.7 F | OXYGEN SATURATION: 97 % | HEART RATE: 80 BPM | WEIGHT: 174.31 LBS | DIASTOLIC BLOOD PRESSURE: 84 MMHG

## 2021-05-09 VITALS
WEIGHT: 168.37 LBS | HEART RATE: 72 BPM | SYSTOLIC BLOOD PRESSURE: 118 MMHG | BODY MASS INDEX: 28.74 KG/M2 | TEMPERATURE: 97.1 F | DIASTOLIC BLOOD PRESSURE: 80 MMHG | OXYGEN SATURATION: 97 % | HEIGHT: 64 IN

## 2021-05-09 VITALS
BODY MASS INDEX: 28.51 KG/M2 | HEIGHT: 64 IN | WEIGHT: 167 LBS | DIASTOLIC BLOOD PRESSURE: 84 MMHG | TEMPERATURE: 97.3 F | OXYGEN SATURATION: 96 % | HEART RATE: 102 BPM | SYSTOLIC BLOOD PRESSURE: 128 MMHG

## 2021-05-09 VITALS
HEART RATE: 91 BPM | TEMPERATURE: 97 F | DIASTOLIC BLOOD PRESSURE: 90 MMHG | WEIGHT: 173 LBS | SYSTOLIC BLOOD PRESSURE: 138 MMHG | OXYGEN SATURATION: 97 %

## 2021-05-09 VITALS
HEIGHT: 64 IN | WEIGHT: 168.25 LBS | SYSTOLIC BLOOD PRESSURE: 154 MMHG | DIASTOLIC BLOOD PRESSURE: 90 MMHG | HEART RATE: 94 BPM | OXYGEN SATURATION: 98 % | BODY MASS INDEX: 28.73 KG/M2 | TEMPERATURE: 97.8 F

## 2021-05-09 VITALS
WEIGHT: 173 LBS | DIASTOLIC BLOOD PRESSURE: 100 MMHG | HEART RATE: 95 BPM | SYSTOLIC BLOOD PRESSURE: 140 MMHG | OXYGEN SATURATION: 96 % | TEMPERATURE: 97.2 F

## 2021-05-15 VITALS — WEIGHT: 165 LBS | BODY MASS INDEX: 29.23 KG/M2 | OXYGEN SATURATION: 98 % | HEART RATE: 91 BPM | HEIGHT: 63 IN

## 2021-05-16 VITALS — BODY MASS INDEX: 30.65 KG/M2 | WEIGHT: 173 LBS | HEIGHT: 63 IN | HEART RATE: 70 BPM | OXYGEN SATURATION: 96 %

## 2021-05-16 VITALS — BODY MASS INDEX: 30.83 KG/M2 | HEIGHT: 63 IN | HEART RATE: 86 BPM | WEIGHT: 174 LBS | OXYGEN SATURATION: 96 %

## 2021-06-05 NOTE — H&P
History and Physical      Patient Name: Corky Gil   Patient ID: [id number]   Sex: Male   YOB: 1975    Primary Care Provider: Zoe HOPKINS   Referring Provider: Zoe HOPKINS    Visit Date: May 7, 2021    Provider: Narda Mary MD   Location: [facility]   Location Address: [address] [place] [address]  [id number]   Location Phone: [phone]          Chief Complaint  · Patient is here for urological concerns      History Of Present Illness  The patient is a 45 year old /White male , who presents on referral from Zoe HOPKINS, after ER visit, for a urological evaluation for the symptoms of acute epididymitis.   Current symptoms:  The patient's urologic symptoms include scrotal pain which began approximately 2 weeks ago and were rapid in onset, but continued to persist which are moderate to severe involving the left side. The symptoms were not related to voiding. Pain had been getting better but notes pain today. Has completed course of Augmentin x10 days since ER visit. States that he is not having any redness and the swelling is gone down since ER. Notes that he typically does not wear underwear but had been wearing underwear since ER; has not worn underwear in the past 4 days and notes a significant difference in his discomfort.   Diagnostic studies:  Up to this point, diagnostic studies included: scrotal ultrasound. The scrotal ultrasound showed Left epididymitis with increased vascularity on the left.      Work-up in ER included UA which was was unremarkable, 4/18/2021.  Gonorrhea chlamydia negative.         Past Medical History  Neurologic disorder; Seizures         Past Surgical History  Vasectomy         Medication List  cetirizine 10 mg oral tablet; diclofenac sodium 75 mg oral tablet,delayed release (DR/EC); oxaprozin 600 mg oral tablet; Tegretol oral         Allergy List  Dilantin; naproxen       Allergies  "Reconciled  Family Medical History  Heart Disease; Cancer, Unspecified; Diabetes         Social History  Alcohol Use (Current some day); lives with children; lives with spouse; .; Recreational Drug Use (Never); Tobacco (Current every day); Working         Review of Systems  · Constitutional  o Denies  o : fever, chills  · Gastrointestinal  o Denies  o : nausea, vomiting      Vitals  Date Time BP Position Site L\R Cuff Size HR RR TEMP (F) WT  HT  BMI kg/m2 BSA m2 O2 Sat FR L/min FiO2        05/07/2021 11:33 AM       16  171lbs 0oz 5'  3\" 30.29 1.86             Physical Examination  · Constitutional  o Appearance  o : Well nourished, well developed patient in no acute distress.  · Eyes  o Conjunctivae  o : Conjunctivae normal  o Sclerae  o : Sclerae white  · Ears, Nose, Mouth and Throat  o Ears  o :   § Hearing  § : Intact to conversational voice both ears  § Ears  § : Normal  o Nose  o :   § External Nose  § : Appearance normal  · Genitourinary  o Penis  o : normal general appearance  o Scrotum and Scrotal Contents  o :   § Scrotum  § : scrotum normal without lesions, cysts or rashes; no significant erythema or edema.  § Epididymides  § : Slight tenderness on left but tolerant to exam; no warmth  · Skin and Subcutaneous Tissue  o General Inspection  o : No rashes, lesions, or areas of discoloration present  o General Palpation  o : Skin Turgor Normal  · Neurologic  o Mental Status Examination  o :   § Orientation  § : Alert and Oriented X3  o Gait and Station  o :   § Gait Screening  § : Ambulating without difficulty  · Psychiatric  o Mood and Affect  o : Mood normal, affect appropriate          Results  · In-Office Procedures  o Lab procedure  § Automated Dipstick Urinalysis (Surg Spec) WITHOUT Micro [facility] (37220)   § Color Ur: Yellow   § Clarity Ur: Clear   § Glucose Ur Ql Strip: Negative   § Bilirub Ur Ql Strip: Negative   § Ketones Ur Ql Strip: Negative   § Sp Gr Ur Qn: greater than 1.030   § Hgb Ur Ql " Strip: Negative   § pH Ur-LsCnc: 6.0   § Prot Ur Ql Strip: Negative   § Urobilinogen Ur Strip-mCnc: 0.2 E.U./dL   § Nitrite Ur Ql Strip: Negative   § WBC Est Ur Ql Strip: Negative        [facility]      PACS RADIOLOGY REPORT    Patient: OBIE TREJO Acct: #[id number] Report: #[id number]    UNIT #: [id number]  DOS: 21 0254  INSURANCE:[employer] ORDER #:[id number]  LOCATION:ER   : 1975    PROVIDERS  ADMITTING:   ATTENDING:   FAMILY:  REGINO VELASQUEZ ORDERING:  FROY MERINO   OTHER:  DICTATING:  Neftali Moe MD, JR    REQ #:[id number]   EXAM:CIARA - TESTICULAR  REASON FOR EXAM:  Scrotal Swelling  REASON FOR VISIT:  LT TESTICLE SWOLLEN    *******Signed******     PROCEDURE: U/S TESTICLE     COMPARISON:  [facility], , TESTICLE ULTRASOUND, 2016, 15:43.     INDICATIONS:  LEFT-SIDED SCROTAL/TESTICULAR SWELLING, PAIN, AND TENDERNESS.     TECHNIQUE:  The scrotum and testicles (testes) were evaluated by routine duplex ultrasound   examination, including two-dimensional grayscale images as well as color/spectral duplex Doppler   analysis.       FINDINGS:      TESTES:  Normal.  No visible mass.  Normal echotexture, size, and flow are noted.  The right testis   measures 4.1 x 2.6 x 2.5 cm. The left testis measures 3.8 x 3.2 x 2.3 cm.      EPIDIDYMIDES:   Acute left-sided epididymitis is suggested with asymmetric increased color flow   echoes involving the left epididymis by Doppler ultrasound, especially involving the left   epididymal tail, which is asymmetrically prominent when compared with the contralateral right   epididymis.  Otherwise, no acute findings are seen involving the right epididymis.  No acute   right-sided epididymitis.  The AP dimension of the right epididymal head is 1.3 cm.  The AP   dimension of the left epididymal head is 1 cm.      OTHER:  Small bilateral hydroceles are seen.  No varicocele is appreciated.  No  bowel-containing   inguinal hernia, extending into the scrotal sac, is identified.  No definite scrotal wall   thickening is appreciated.     CONCLUSION:      1. Acute left-sided epididymitis is suspected by duplex ultrasound examination.      2. No other acute findings are appreciated.       3. No testicular torsion is identified.      4. No intrinsic or extrinsic testicular masses are seen.      5. No acute right-sided epididymo-orchitis is appreciated.  No acute left-sided orchitis is   suspected.     PAMELA AVILA JR, MD         Electronically Signed and Approved By: PAMELA AVILA JR, MD on 4/18/2021 at 5:15                     Until signed, this is an unconfirmed preliminary report that may contain  errors and is subject to change.                CARJI:  D:04/18/21 0516         Assessment  · Left Epididymitis     604.90/N45.1    Problems Reconciled  Plan  · Medications  o Medications have been Reconciled  o Transition of Care or Provider Policy  · Instructions  o Electronically Identified Patient Education Materials Provided Electronically     Left epididymitis: No evidence of residual infection    After completion of course of Augmentin.  Patient notify if scrotum becomes red or warm, swelling recurs.    Believe patient's current symptoms to be likely related to residual inflammation.  Discussed strategies for pain management with epididymitis.  Discussed that the symptoms can take up to a month and pain would be expected.  Trial of Daypro 600 p.o. twice daily for antiinflammation as well as pain; side effects including GI upset discussed.    Discussed cold therapy and scrotal support; limiting activity and monitoring for activities that exacerbate the pain.    Patient to follow-up in 4-6 weeks or earlier as needed  All questions addressed    Moderate: 1 chronic illness with exacerbation, progression; review of imaging, ER records; prescription drug management             Electronically Signed by: Narda  MD Usman -Author on May 7, 2021 12:09:41 PM

## 2021-06-11 ENCOUNTER — OFFICE VISIT (OUTPATIENT)
Dept: UROLOGY | Facility: CLINIC | Age: 46
End: 2021-06-11

## 2021-06-11 VITALS — BODY MASS INDEX: 30.65 KG/M2 | RESPIRATION RATE: 12 BRPM | HEIGHT: 63 IN | WEIGHT: 173 LBS

## 2021-06-11 DIAGNOSIS — N45.1 EPIDIDYMITIS: Primary | ICD-10-CM

## 2021-06-11 PROBLEM — R56.9 SEIZURES: Status: ACTIVE | Noted: 2021-06-11

## 2021-06-11 PROBLEM — R01.1 HEART MURMUR: Status: ACTIVE | Noted: 2021-06-11

## 2021-06-11 PROBLEM — G98.8 NEUROLOGIC DISORDER: Status: ACTIVE | Noted: 2021-06-11

## 2021-06-11 PROCEDURE — 99212 OFFICE O/P EST SF 10 MIN: CPT | Performed by: UROLOGY

## 2021-06-11 RX ORDER — CETIRIZINE HYDROCHLORIDE 10 MG/1
10 TABLET ORAL AS NEEDED
COMMUNITY

## 2021-06-11 RX ORDER — IBUPROFEN 600 MG/1
TABLET ORAL
COMMUNITY
Start: 2021-04-18 | End: 2022-11-09

## 2021-06-11 RX ORDER — CARBAMAZEPINE 200 MG/1
TABLET ORAL
COMMUNITY
End: 2022-11-09

## 2021-06-11 NOTE — PROGRESS NOTES
UROLOGY OFFICE FOLLOW-UP NOTE    Subjective   HPI  45 year old /White male , presents for follow-up of acute epididymitis.  Patient took Daypro as prescribed.  Now wearing underwear.  States that he only gets very rare twinge of pain which lasts approximately 3 seconds.  Significant improvement since last visit.  Believes overall continues to be getting better.  Denies significant bother with the current episodic pain; feeling well.  No new complaints today.    _____________   Work-up in ER included UA which was was unremarkable, 4/18/2021.  Gonorrhea chlamydia negative.     Testicular ultrasound, 4/18/2021: Acute left-sided epididymitis is suspected by duplex ultrasound examination; no other acute findings.      Medical History:  Past Medical History:   Diagnosis Date   • Neurologic disorder    • Seizures (CMS/HCC)         Social History:  Social History     Socioeconomic History   • Marital status:      Spouse name: Not on file   • Number of children: Not on file   • Years of education: Not on file   • Highest education level: Not on file   Tobacco Use   • Smoking status: Current Every Day Smoker     Packs/day: 1.00   • Tobacco comment: smoked 21-30 years   Substance and Sexual Activity   • Alcohol use: Yes     Comment: Current some day  rarely drinks, less than 1 drink per day, has been drinking for 11-20 yearrs        Family History:  Family History   Problem Relation Age of Onset   • Cancer Mother    • Diabetes Other    • Heart disease Other         Surgical History:  Past Surgical History:   Procedure Laterality Date   • VASECTOMY          Allergies:  Allergies   Allergen Reactions   • Wasp Venom Anaphylaxis   • Phenytoin Seizure   • Naproxen Rash        Current Medications:  Current Outpatient Medications   Medication Sig Dispense Refill   • carBAMazepine (TEGretol) 200 MG tablet      • cetirizine (zyrTEC) 10 MG tablet cetirizine 10 mg oral tablet take 1 tablet (10 mg) by oral route once  "daily   Active     • ibuprofen (ADVIL,MOTRIN) 600 MG tablet        No current facility-administered medications for this visit.       Review of systems  Constitutional: Denies fever chills  GI: Denies nausea, vomiting    Objective     Vital Signs:   Resp 12   Ht 160 cm (63\")   Wt 78.5 kg (173 lb)   BMI 30.65 kg/m²       Physical exam  No acute distress, well-nourished  Awake alert and oriented  Mood normal; affect normal   exam: Scrotum without erythema or edema; bilateral normal testis and epididymis; tolerant to exam; no tenderness or erythema    Problem List:  Patient Active Problem List   Diagnosis   • Heart murmur   • Neurologic disorder   • Seizures (CMS/[facility])       Assessment/Plan   Problems Addressed this Visit     None      Visit Diagnoses     Epididymitis    -  Primary      Diagnoses       Codes Comments    Epididymitis    -  Primary ICD-10-CM: N45.1  ICD-9-CM: 604.90                Left epididymitis: Resolved; no indication for continued treatment  Discussed conservative management options for persistent scrotal pain.  Discussed cold therapy and scrotal support; limiting activity and monitoring for activities that exacerbate the pain.  Patient to continue to monitor symptoms and seek evaluation if they persist or worsen  Patient encouraged to arrange follow-up if needed           Signed:  Narda Meyers MD  06/11/21  12:11 EDT    "

## 2021-07-15 VITALS — RESPIRATION RATE: 16 BRPM | BODY MASS INDEX: 30.3 KG/M2 | WEIGHT: 171 LBS | HEIGHT: 63 IN

## 2021-07-16 ENCOUNTER — OFFICE VISIT (OUTPATIENT)
Dept: FAMILY MEDICINE CLINIC | Facility: CLINIC | Age: 46
End: 2021-07-16

## 2021-07-16 VITALS
HEART RATE: 108 BPM | SYSTOLIC BLOOD PRESSURE: 132 MMHG | OXYGEN SATURATION: 99 % | DIASTOLIC BLOOD PRESSURE: 84 MMHG | TEMPERATURE: 97.8 F

## 2021-07-16 DIAGNOSIS — Z91.030 BEE STING ALLERGY: Primary | ICD-10-CM

## 2021-07-16 PROCEDURE — 99213 OFFICE O/P EST LOW 20 MIN: CPT | Performed by: FAMILY MEDICINE

## 2021-07-16 PROCEDURE — 96372 THER/PROPH/DIAG INJ SC/IM: CPT | Performed by: FAMILY MEDICINE

## 2021-07-16 RX ORDER — EPINEPHRINE 0.3 MG/.3ML
0.3 INJECTION SUBCUTANEOUS ONCE
Qty: 2 EACH | Refills: 3 | Status: SHIPPED | OUTPATIENT
Start: 2021-07-16 | End: 2021-07-16

## 2021-07-16 RX ORDER — METHYLPREDNISOLONE ACETATE 40 MG/ML
40 INJECTION, SUSPENSION INTRA-ARTICULAR; INTRALESIONAL; INTRAMUSCULAR; SOFT TISSUE ONCE
Status: COMPLETED | OUTPATIENT
Start: 2021-07-16 | End: 2021-07-16

## 2021-07-16 RX ORDER — EPINEPHRINE 0.1 MG/ML
0.3 SYRINGE (ML) INJECTION ONCE
Status: SHIPPED | OUTPATIENT
Start: 2021-07-16

## 2021-07-16 RX ADMIN — METHYLPREDNISOLONE ACETATE 40 MG: 40 INJECTION, SUSPENSION INTRA-ARTICULAR; INTRALESIONAL; INTRAMUSCULAR; SOFT TISSUE at 11:36

## 2021-07-16 NOTE — PROGRESS NOTES
Chief Complaint    Insect Bite (ALLERGIC)    Subjective      Corky Gil presents to [facility]     History of Present Illness    1.) ALLERGIC RXN (BEE STING) : Occurred - 2 hours ago. Patient presents with complaints of 'feeling jittery.' He also reports chest tightness. History of allergy to bee stings. Prescribed an epi-pen, but reports expiration.    Objective      Vital Signs:     /84   Pulse 108   Temp 97.8 °F (36.6 °C)   SpO2 99%       Physical Exam  Vitals reviewed.   Constitutional:       General: He is in acute distress.      Appearance: Normal appearance. He is well-developed.      Comments: clammy   HENT:      Head: Normocephalic and atraumatic.      Right Ear: Hearing and external ear normal.      Left Ear: Hearing and external ear normal.      Nose: Nose normal.   Eyes:      General: Lids are normal.         Right eye: No discharge.         Left eye: No discharge.      Conjunctiva/sclera: Conjunctivae normal.   Cardiovascular:      Pulses: Normal pulses.      Heart sounds: Normal heart sounds.   Pulmonary:      Effort: Pulmonary effort is normal.      Breath sounds: Normal breath sounds.   Abdominal:      General: There is no distension.   Musculoskeletal:         General: No swelling.      Cervical back: Neck supple.   Skin:     Coloration: Skin is not jaundiced.      Findings: No erythema.   Neurological:      Mental Status: He is alert. Mental status is at baseline.   Psychiatric:         Mood and Affect: Mood and affect normal.         Thought Content: Thought content normal.     Assessment and Plan    Diagnoses and all orders for this visit:    1. Bee sting allergy (Primary)  -     EPINEPHrine (ADRENALIN) injection 0.3 mg  -     methylPREDNISolone acetate (DEPO-medrol) injection 40 mg    Other orders  -     EPINEPHrine (EpiPen 2-Nick) 0.3 MG/0.3ML solution auto-injector injection; Inject 0.3 mL into the appropriate muscle as directed by prescriber 1  (One) Time for 1 dose.  Dispense: 2 each; Refill: 3    Epineprhine administered here in office - with improvement of symptoms. Patient was monitored - prescription for Epi-pen sent to [facility]. IM steroid here in office. Advised to contact office promptly if sxs return.    Follow Up     Return if symptoms worsen or fail to improve.     Patient was given instructions and counseling regarding his condition or for health maintenance advice. Please see specific information pulled into the AVS if appropriate.

## 2021-07-21 ENCOUNTER — TELEPHONE (OUTPATIENT)
Dept: FAMILY MEDICINE CLINIC | Facility: CLINIC | Age: 46
End: 2021-07-21

## 2021-07-21 NOTE — TELEPHONE ENCOUNTER
CALLER: JUAN DANIEL     RELATIONSHIP: FOR [facility]     PHONE NUMBER: [phone]    REASON FOR CALL. CALLER CALLED IN TO GIVE THE OFFICE INFORMATION REGARDING THE VISIT ON July 16 WHEN PATIENT WAS SEEN AND TREATED FOR A BEE STING.      [employer] IS   [employer] PHONE NUMBER  [phone]    PATIENT'S CLAIM NUMBER [id number]    PLEASE ADVISE IF YOU HAVE ANY QUESTIONS. THANKS.

## 2022-01-25 ENCOUNTER — CLINICAL SUPPORT (OUTPATIENT)
Dept: FAMILY MEDICINE CLINIC | Facility: CLINIC | Age: 47
End: 2022-01-25

## 2022-01-25 DIAGNOSIS — Z20.822 EXPOSURE TO COVID-19 VIRUS: Primary | ICD-10-CM

## 2022-01-25 LAB — SARS-COV-2 RNA PNL SPEC NAA+PROBE: NOT DETECTED

## 2022-01-25 PROCEDURE — 99211 OFF/OP EST MAY X REQ PHY/QHP: CPT | Performed by: FAMILY MEDICINE

## 2022-01-25 PROCEDURE — U0004 COV-19 TEST NON-CDC HGH THRU: HCPCS | Performed by: FAMILY MEDICINE

## 2022-03-01 ENCOUNTER — OFFICE VISIT (OUTPATIENT)
Dept: FAMILY MEDICINE CLINIC | Facility: CLINIC | Age: 47
End: 2022-03-01

## 2022-03-01 VITALS
TEMPERATURE: 97.5 F | BODY MASS INDEX: 30.83 KG/M2 | OXYGEN SATURATION: 97 % | HEART RATE: 105 BPM | WEIGHT: 174 LBS | SYSTOLIC BLOOD PRESSURE: 140 MMHG | DIASTOLIC BLOOD PRESSURE: 88 MMHG | HEIGHT: 63 IN

## 2022-03-01 DIAGNOSIS — K62.5 RECTAL BLEEDING: Primary | ICD-10-CM

## 2022-03-01 LAB
BASOPHILS # BLD AUTO: 0.07 10*3/MM3 (ref 0–0.2)
BASOPHILS NFR BLD AUTO: 0.7 % (ref 0–1.5)
DEPRECATED RDW RBC AUTO: 45.4 FL (ref 37–54)
EOSINOPHIL # BLD AUTO: 0.17 10*3/MM3 (ref 0–0.4)
EOSINOPHIL NFR BLD AUTO: 1.8 % (ref 0.3–6.2)
ERYTHROCYTE [DISTWIDTH] IN BLOOD BY AUTOMATED COUNT: 13.2 % (ref 12.3–15.4)
HCT VFR BLD AUTO: 42.8 % (ref 37.5–51)
HGB BLD-MCNC: 14.3 G/DL (ref 13–17.7)
IMM GRANULOCYTES # BLD AUTO: 0.04 10*3/MM3 (ref 0–0.05)
IMM GRANULOCYTES NFR BLD AUTO: 0.4 % (ref 0–0.5)
LYMPHOCYTES # BLD AUTO: 3.41 10*3/MM3 (ref 0.7–3.1)
LYMPHOCYTES NFR BLD AUTO: 36.4 % (ref 19.6–45.3)
MCH RBC QN AUTO: 30.8 PG (ref 26.6–33)
MCHC RBC AUTO-ENTMCNC: 33.4 G/DL (ref 31.5–35.7)
MCV RBC AUTO: 92.2 FL (ref 79–97)
MONOCYTES # BLD AUTO: 1.03 10*3/MM3 (ref 0.1–0.9)
MONOCYTES NFR BLD AUTO: 11 % (ref 5–12)
NEUTROPHILS NFR BLD AUTO: 4.64 10*3/MM3 (ref 1.7–7)
NEUTROPHILS NFR BLD AUTO: 49.7 % (ref 42.7–76)
NRBC BLD AUTO-RTO: 0 /100 WBC (ref 0–0.2)
PLATELET # BLD AUTO: 358 10*3/MM3 (ref 140–450)
PMV BLD AUTO: 11.5 FL (ref 6–12)
RBC # BLD AUTO: 4.64 10*6/MM3 (ref 4.14–5.8)
WBC NRBC COR # BLD: 9.36 10*3/MM3 (ref 3.4–10.8)

## 2022-03-01 PROCEDURE — 99213 OFFICE O/P EST LOW 20 MIN: CPT | Performed by: NURSE PRACTITIONER

## 2022-03-01 PROCEDURE — 85025 COMPLETE CBC W/AUTO DIFF WBC: CPT | Performed by: NURSE PRACTITIONER

## 2022-03-01 PROCEDURE — 36415 COLL VENOUS BLD VENIPUNCTURE: CPT | Performed by: NURSE PRACTITIONER

## 2022-03-01 NOTE — PROGRESS NOTES
Venipuncture Blood Specimen Collection  Venipuncture performed in left arm by Brittney Duarte with good hemostasis. Patient tolerated the procedure well without complications.   03/01/22   Brittney Duarte

## 2022-03-01 NOTE — PROGRESS NOTES
Chief Complaint  Rectal Bleeding (has had for years but saturday was heavy amount of blood)    PHQ-2 Total Score: 1    Subjective        Past Medical History:   Diagnosis Date   • Neurologic disorder    • Seizures (HCC)      Social History     Tobacco Use   • Smoking status: Current Every Day Smoker     Packs/day: 1.50   • Smokeless tobacco: Never Used   • Tobacco comment: smoked 21-30 years   Vaping Use   • Vaping Use: Never used   Substance Use Topics   • Alcohol use: Yes     Comment: Current some day  rarely drinks, less than 1 drink per day, has been drinking for 11-20 yearrs   • Drug use: Not on file      Current Outpatient Medications on File Prior to Visit   Medication Sig   • carBAMazepine (TEGretol) 200 MG tablet    • cetirizine (zyrTEC) 10 MG tablet cetirizine 10 mg oral tablet take 1 tablet (10 mg) by oral route once daily   Active   • ibuprofen (ADVIL,MOTRIN) 600 MG tablet      Current Facility-Administered Medications on File Prior to Visit   Medication   • EPINEPHrine (ADRENALIN) injection 0.3 mg      Allergies   Allergen Reactions   • Wasp Venom Anaphylaxis   • Phenytoin Seizure   • Naproxen Rash      Health Maintenance Due   Topic Date Due   • COLORECTAL CANCER SCREENING  Never done   • ANNUAL PHYSICAL  Never done   • COVID-19 Vaccine (1) Never done   • Pneumococcal Vaccine 0-64 (1 of 2 - PPSV23) Never done   • TDAP/TD VACCINES (1 - Tdap) Never done   • HEPATITIS C SCREENING  Never done   • INFLUENZA VACCINE  08/01/2021      Corky DANIELLA Gil presents to [facility]  Here for rectal bleeding. Pt states hx of rectal bleeding when wiping. On Saturday, 3 days ago, pt noted bright red blood in the water and with wiping. Previous episode was 2 weeks ago. No residual/continual bleeding. Pt states strong family history or multiple cancers. No known hemorrhoids. Will note occasional diarrhea lasting 1-2 episodes. Denies abdominal pain or pain with defecation. States it was  "darker stool on Saturday but not consistently dark. Occasional dizziness but not consistently. Increased fatigue.       Objective   Vital Signs:   /88 (BP Location: Left arm)   Pulse 105   Temp 97.5 °F (36.4 °C)   Ht 160 cm (63\")   Wt 78.9 kg (174 lb)   SpO2 97%   BMI 30.82 kg/m²     Review of Systems   Constitutional: Positive for fatigue.   Gastrointestinal: Positive for blood in stool. Negative for abdominal pain, constipation and diarrhea.      Physical Exam  Vitals reviewed.   Constitutional:       General: He is not in acute distress.     Appearance: Normal appearance. He is well-developed.   Eyes:      Conjunctiva/sclera: Conjunctivae normal.      Pupils: Pupils are equal, round, and reactive to light.   Cardiovascular:      Rate and Rhythm: Normal rate and regular rhythm.      Heart sounds: Normal heart sounds.   Pulmonary:      Effort: Pulmonary effort is normal.      Breath sounds: Normal breath sounds.   Genitourinary:     Rectum: No external hemorrhoid.   Skin:     General: Skin is warm and dry.   Neurological:      Mental Status: He is alert and oriented to person, place, and time.   Psychiatric:         Mood and Affect: Mood and affect normal.         Behavior: Behavior normal.         Thought Content: Thought content normal.         Judgment: Judgment normal.        Result Review :                 Assessment and Plan    Diagnoses and all orders for this visit:    1. Rectal bleeding (Primary)  -     Ambulatory Referral to Gastroenterology  -     Occult Blood X 1, Stool - Stool, Per Rectum  -     CBC Auto Differential    Pt to follow up with Gastro and discussed he will likely need a colonoscopy for further evaluation.     Follow Up   Return if symptoms worsen or fail to improve.  Patient was given instructions and counseling regarding his condition or for health maintenance advice. Please see specific information pulled into the AVS if appropriate.       "

## 2022-03-04 ENCOUNTER — CLINICAL SUPPORT (OUTPATIENT)
Dept: FAMILY MEDICINE CLINIC | Facility: CLINIC | Age: 47
End: 2022-03-04

## 2022-03-04 DIAGNOSIS — K62.5 RECTAL BLEED: ICD-10-CM

## 2022-03-04 LAB — HEMOCCULT STL QL IA: NEGATIVE

## 2022-03-04 PROCEDURE — 82274 ASSAY TEST FOR BLOOD FECAL: CPT | Performed by: NURSE PRACTITIONER

## 2022-03-18 ENCOUNTER — LAB (OUTPATIENT)
Dept: LAB | Facility: HOSPITAL | Age: 47
End: 2022-03-18

## 2022-03-18 ENCOUNTER — TRANSCRIBE ORDERS (OUTPATIENT)
Dept: LAB | Facility: HOSPITAL | Age: 47
End: 2022-03-18

## 2022-03-18 DIAGNOSIS — Z79.899 ENCOUNTER FOR LONG-TERM (CURRENT) USE OF OTHER MEDICATIONS: ICD-10-CM

## 2022-03-18 DIAGNOSIS — Z79.899 ENCOUNTER FOR LONG-TERM (CURRENT) USE OF OTHER MEDICATIONS: Primary | ICD-10-CM

## 2022-03-18 LAB
ALBUMIN SERPL-MCNC: 4.7 G/DL (ref 3.5–5.2)
ALBUMIN/GLOB SERPL: 1.6 G/DL
ALP SERPL-CCNC: 103 U/L (ref 39–117)
ALT SERPL W P-5'-P-CCNC: 25 U/L (ref 1–41)
ANION GAP SERPL CALCULATED.3IONS-SCNC: 10.6 MMOL/L (ref 5–15)
AST SERPL-CCNC: 18 U/L (ref 1–40)
BILIRUB SERPL-MCNC: <0.2 MG/DL (ref 0–1.2)
BUN SERPL-MCNC: 9 MG/DL (ref 6–20)
BUN/CREAT SERPL: 11.4 (ref 7–25)
CALCIUM SPEC-SCNC: 9.5 MG/DL (ref 8.6–10.5)
CHLORIDE SERPL-SCNC: 104 MMOL/L (ref 98–107)
CO2 SERPL-SCNC: 24.4 MMOL/L (ref 22–29)
CREAT SERPL-MCNC: 0.79 MG/DL (ref 0.76–1.27)
DEPRECATED RDW RBC AUTO: 42.7 FL (ref 37–54)
EGFRCR SERPLBLD CKD-EPI 2021: 111.6 ML/MIN/1.73
EOSINOPHIL # BLD MANUAL: 0.23 10*3/MM3 (ref 0–0.4)
EOSINOPHIL NFR BLD MANUAL: 2.1 % (ref 0.3–6.2)
ERYTHROCYTE [DISTWIDTH] IN BLOOD BY AUTOMATED COUNT: 12.9 % (ref 12.3–15.4)
GLOBULIN UR ELPH-MCNC: 3 GM/DL
GLUCOSE SERPL-MCNC: 126 MG/DL (ref 65–99)
HCT VFR BLD AUTO: 43.3 % (ref 37.5–51)
HGB BLD-MCNC: 14.5 G/DL (ref 13–17.7)
LYMPHOCYTES # BLD MANUAL: 4.21 10*3/MM3 (ref 0.7–3.1)
LYMPHOCYTES NFR BLD MANUAL: 4.1 % (ref 5–12)
MCH RBC QN AUTO: 30.9 PG (ref 26.6–33)
MCHC RBC AUTO-ENTMCNC: 33.5 G/DL (ref 31.5–35.7)
MCV RBC AUTO: 92.1 FL (ref 79–97)
MONOCYTES # BLD: 0.44 10*3/MM3 (ref 0.1–0.9)
NEUTROPHILS # BLD AUTO: 5.87 10*3/MM3 (ref 1.7–7)
NEUTROPHILS NFR BLD MANUAL: 54.6 % (ref 42.7–76)
NRBC BLD AUTO-RTO: 0 /100 WBC (ref 0–0.2)
PLAT MORPH BLD: NORMAL
PLATELET # BLD AUTO: 346 10*3/MM3 (ref 140–450)
PMV BLD AUTO: 11.3 FL (ref 6–12)
POTASSIUM SERPL-SCNC: 4.4 MMOL/L (ref 3.5–5.2)
PROT SERPL-MCNC: 7.7 G/DL (ref 6–8.5)
RBC # BLD AUTO: 4.7 10*6/MM3 (ref 4.14–5.8)
RBC MORPH BLD: NORMAL
SMUDGE CELLS BLD QL SMEAR: ABNORMAL
SODIUM SERPL-SCNC: 139 MMOL/L (ref 136–145)
VARIANT LYMPHS NFR BLD MANUAL: 39.2 % (ref 19.6–45.3)
WBC NRBC COR # BLD: 10.75 10*3/MM3 (ref 3.4–10.8)

## 2022-03-18 PROCEDURE — 85007 BL SMEAR W/DIFF WBC COUNT: CPT

## 2022-03-18 PROCEDURE — 85025 COMPLETE CBC W/AUTO DIFF WBC: CPT

## 2022-03-18 PROCEDURE — 80053 COMPREHEN METABOLIC PANEL: CPT

## 2022-03-18 PROCEDURE — 36415 COLL VENOUS BLD VENIPUNCTURE: CPT

## 2022-04-21 ENCOUNTER — CLINICAL SUPPORT (OUTPATIENT)
Dept: GASTROENTEROLOGY | Facility: CLINIC | Age: 47
End: 2022-04-21

## 2022-04-21 ENCOUNTER — PREP FOR SURGERY (OUTPATIENT)
Dept: OTHER | Facility: HOSPITAL | Age: 47
End: 2022-04-21

## 2022-04-21 DIAGNOSIS — K62.5 RECTAL BLEED: Primary | ICD-10-CM

## 2022-04-21 RX ORDER — SODIUM PICOSULFATE, MAGNESIUM OXIDE, AND ANHYDROUS CITRIC ACID 10; 3.5; 12 MG/160ML; G/160ML; G/160ML
160 LIQUID ORAL TAKE AS DIRECTED
Qty: 320 ML | Refills: 0 | Status: ON HOLD | OUTPATIENT
Start: 2022-04-21 | End: 2022-07-15

## 2022-04-21 NOTE — PROGRESS NOTES
Corky Gil     REASON FOR CALL-Colonoscopy, rectal bleed, first time    SENT IN PREP-clenpiq  DOS-07.15.2022  Past Medical History:   Diagnosis Date   • Neurologic disorder    • Seizures (HCC)      Allergies   Allergen Reactions   • Wasp Venom Anaphylaxis   • Phenytoin Seizure   • Naproxen Rash     Past Surgical History:   Procedure Laterality Date   • VASECTOMY       Social History     Socioeconomic History   • Marital status:    Tobacco Use   • Smoking status: Current Every Day Smoker     Packs/day: 1.50   • Smokeless tobacco: Never Used   • Tobacco comment: smoked 21-30 years   Vaping Use   • Vaping Use: Never used   Substance and Sexual Activity   • Alcohol use: Yes     Comment: Current some day  rarely drinks, less than 1 drink per day, has been drinking for 11-20 yearrs   • Drug use: Defer   • Sexual activity: Defer     Family History   Problem Relation Age of Onset   • Cancer Mother    • Diabetes Other    • Heart disease Other    • Colon cancer Neg Hx        Current Outpatient Medications:   •  carBAMazepine (TEGretol) 200 MG tablet, , Disp: , Rfl:   •  cetirizine (zyrTEC) 10 MG tablet, cetirizine 10 mg oral tablet take 1 tablet (10 mg) by oral route once daily   Active, Disp: , Rfl:   •  ibuprofen (ADVIL,MOTRIN) 600 MG tablet, , Disp: , Rfl:     Current Facility-Administered Medications:   •  EPINEPHrine (ADRENALIN) injection 0.3 mg, 0.3 mg, Intramuscular, Once, Yadira Nance,

## 2022-06-03 ENCOUNTER — OFFICE VISIT (OUTPATIENT)
Dept: FAMILY MEDICINE CLINIC | Facility: CLINIC | Age: 47
End: 2022-06-03

## 2022-06-03 VITALS
BODY MASS INDEX: 28.7 KG/M2 | WEIGHT: 162 LBS | TEMPERATURE: 97.3 F | SYSTOLIC BLOOD PRESSURE: 138 MMHG | OXYGEN SATURATION: 99 % | HEART RATE: 91 BPM | DIASTOLIC BLOOD PRESSURE: 80 MMHG

## 2022-06-03 DIAGNOSIS — R10.32 LLQ ABDOMINAL PAIN: ICD-10-CM

## 2022-06-03 DIAGNOSIS — K29.71 GASTROINTESTINAL HEMORRHAGE ASSOCIATED WITH GASTRITIS, UNSPECIFIED GASTRITIS TYPE: Primary | ICD-10-CM

## 2022-06-03 DIAGNOSIS — K29.71 GASTROINTESTINAL HEMORRHAGE ASSOCIATED WITH GASTRITIS, UNSPECIFIED GASTRITIS TYPE: ICD-10-CM

## 2022-06-03 LAB
ALBUMIN SERPL-MCNC: 4.8 G/DL (ref 3.5–5.2)
ALBUMIN/GLOB SERPL: 1.7 G/DL
ALP SERPL-CCNC: 118 U/L (ref 39–117)
ALT SERPL W P-5'-P-CCNC: 22 U/L (ref 1–41)
ANION GAP SERPL CALCULATED.3IONS-SCNC: 10.3 MMOL/L (ref 5–15)
AST SERPL-CCNC: 20 U/L (ref 1–40)
BASOPHILS # BLD AUTO: 0.06 10*3/MM3 (ref 0–0.2)
BASOPHILS NFR BLD AUTO: 0.6 % (ref 0–1.5)
BILIRUB SERPL-MCNC: 0.2 MG/DL (ref 0–1.2)
BUN SERPL-MCNC: 12 MG/DL (ref 6–20)
BUN/CREAT SERPL: 15.2 (ref 7–25)
CALCIUM SPEC-SCNC: 9.6 MG/DL (ref 8.6–10.5)
CHLORIDE SERPL-SCNC: 104 MMOL/L (ref 98–107)
CO2 SERPL-SCNC: 24.7 MMOL/L (ref 22–29)
CREAT SERPL-MCNC: 0.79 MG/DL (ref 0.76–1.27)
DEPRECATED RDW RBC AUTO: 38.4 FL (ref 37–54)
EGFRCR SERPLBLD CKD-EPI 2021: 111.6 ML/MIN/1.73
EOSINOPHIL # BLD AUTO: 0.22 10*3/MM3 (ref 0–0.4)
EOSINOPHIL NFR BLD AUTO: 2.2 % (ref 0.3–6.2)
ERYTHROCYTE [DISTWIDTH] IN BLOOD BY AUTOMATED COUNT: 12.2 % (ref 12.3–15.4)
GLOBULIN UR ELPH-MCNC: 2.8 GM/DL
GLUCOSE SERPL-MCNC: 84 MG/DL (ref 65–99)
HCT VFR BLD AUTO: 44.4 % (ref 37.5–51)
HGB BLD-MCNC: 15.3 G/DL (ref 13–17.7)
IMM GRANULOCYTES # BLD AUTO: 0.04 10*3/MM3 (ref 0–0.05)
IMM GRANULOCYTES NFR BLD AUTO: 0.4 % (ref 0–0.5)
LYMPHOCYTES # BLD AUTO: 3.14 10*3/MM3 (ref 0.7–3.1)
LYMPHOCYTES NFR BLD AUTO: 31.5 % (ref 19.6–45.3)
MCH RBC QN AUTO: 30.4 PG (ref 26.6–33)
MCHC RBC AUTO-ENTMCNC: 34.5 G/DL (ref 31.5–35.7)
MCV RBC AUTO: 88.1 FL (ref 79–97)
MONOCYTES # BLD AUTO: 0.97 10*3/MM3 (ref 0.1–0.9)
MONOCYTES NFR BLD AUTO: 9.7 % (ref 5–12)
NEUTROPHILS NFR BLD AUTO: 5.55 10*3/MM3 (ref 1.7–7)
NEUTROPHILS NFR BLD AUTO: 55.6 % (ref 42.7–76)
NRBC BLD AUTO-RTO: 0 /100 WBC (ref 0–0.2)
PLATELET # BLD AUTO: 376 10*3/MM3 (ref 140–450)
PMV BLD AUTO: 10.6 FL (ref 6–12)
POTASSIUM SERPL-SCNC: 4.1 MMOL/L (ref 3.5–5.2)
PROT SERPL-MCNC: 7.6 G/DL (ref 6–8.5)
RBC # BLD AUTO: 5.04 10*6/MM3 (ref 4.14–5.8)
SODIUM SERPL-SCNC: 139 MMOL/L (ref 136–145)
WBC NRBC COR # BLD: 9.98 10*3/MM3 (ref 3.4–10.8)

## 2022-06-03 PROCEDURE — 36415 COLL VENOUS BLD VENIPUNCTURE: CPT | Performed by: FAMILY MEDICINE

## 2022-06-03 PROCEDURE — 80053 COMPREHEN METABOLIC PANEL: CPT | Performed by: FAMILY MEDICINE

## 2022-06-03 PROCEDURE — 85025 COMPLETE CBC W/AUTO DIFF WBC: CPT | Performed by: FAMILY MEDICINE

## 2022-06-03 PROCEDURE — 99213 OFFICE O/P EST LOW 20 MIN: CPT | Performed by: FAMILY MEDICINE

## 2022-06-03 RX ORDER — PANTOPRAZOLE SODIUM 40 MG/1
40 TABLET, DELAYED RELEASE ORAL DAILY
Qty: 30 TABLET | Refills: 1 | Status: SHIPPED | OUTPATIENT
Start: 2022-06-03 | End: 2022-08-03

## 2022-06-03 RX ORDER — PANTOPRAZOLE SODIUM 40 MG/1
40 TABLET, DELAYED RELEASE ORAL DAILY
Qty: 90 TABLET | OUTPATIENT
Start: 2022-06-03

## 2022-06-03 NOTE — PROGRESS NOTES
..  Venipuncture Blood Specimen Collection  Venipuncture performed in left arm by Florina Amaya with good hemostasis. Patient tolerated the procedure well without complications.   06/03/22   Florina Amaya

## 2022-06-03 NOTE — PROGRESS NOTES
Chief Complaint  Rectal Bleeding    Subjective          Corky Gil presents to [facility]  Pt is scheduled colonoscopy for 7/15/2022    Rectal Bleeding  This is a new problem. Episode onset: 2 yrs. The problem occurs intermittently. The problem has been unchanged. Pertinent negatives include no abdominal pain, anorexia, arthralgias, change in bowel habit, chest pain, chills, congestion, coughing, diaphoresis, fatigue, fever, headaches, joint swelling, myalgias, nausea, neck pain, numbness, rash, sore throat, swollen glands, urinary symptoms, vertigo, visual change, vomiting or weakness. Nothing aggravates the symptoms. He has tried nothing for the symptoms.   Abdominal Pain  This is a new problem. The current episode started in the past 7 days. The onset quality is sudden. The problem occurs intermittently. The problem has been unchanged. The pain is located in the LLQ. The pain is moderate. The quality of the pain is aching. The abdominal pain does not radiate. Associated symptoms include hematochezia. Pertinent negatives include no anorexia, arthralgias, belching, constipation, diarrhea, dysuria, fever, flatus, frequency, headaches, hematuria, melena, myalgias, nausea, vomiting or weight loss. Nothing aggravates the pain. The pain is relieved by nothing. He has tried nothing for the symptoms.       Objective   Allergies   Allergen Reactions   • Wasp Venom Anaphylaxis   • Naproxen Rash   • Phenytoin Seizure     Immunization History   Administered Date(s) Administered   • Flu Vaccine Quad PF >36MO 08/28/2017   • Flu Vaccine Split Quad 08/28/2017   • Fluzone Split Quad (Multi-dose) 12/02/2018   • Hepatitis B 04/15/2003, 05/21/2003     Past Medical History:   Diagnosis Date   • Neurologic disorder    • Seizures (HCC)       Past Surgical History:   Procedure Laterality Date   • VASECTOMY        Social History     Socioeconomic History   • Marital status:    Tobacco Use   •  Smoking status: Current Every Day Smoker     Packs/day: 1.50     Years: 30.00     Pack years: 45.00   • Smokeless tobacco: Never Used   • Tobacco comment: smoked 21-30 years   Vaping Use   • Vaping Use: Never used   Substance and Sexual Activity   • Alcohol use: Yes     Comment: Current some day  rarely drinks, less than 1 drink per day, has been drinking for 11-20 yearrs   • Drug use: Defer   • Sexual activity: Defer        Current Outpatient Medications:   •  carBAMazepine (TEGretol) 200 MG tablet, , Disp: , Rfl:   •  cetirizine (zyrTEC) 10 MG tablet, cetirizine 10 mg oral tablet take 1 tablet (10 mg) by oral route once daily   Active, Disp: , Rfl:   •  ibuprofen (ADVIL,MOTRIN) 600 MG tablet, , Disp: , Rfl:   •  pantoprazole (Protonix) 40 MG EC tablet, Take 1 tablet by mouth Daily., Disp: 30 tablet, Rfl: 1  •  Sod Picosulfate-Mag Ox-Cit Acd (Clenpiq) 10-3.5-12 MG-GM -GM/160ML solution, Take 160 mL by mouth Take As Directed., Disp: 320 mL, Rfl: 0    Current Facility-Administered Medications:   •  EPINEPHrine (ADRENALIN) injection 0.3 mg, 0.3 mg, Intramuscular, Once, Yadira Nance,    Family History   Problem Relation Age of Onset   • Cancer Mother    • Diabetes Other    • Heart disease Other    • Colon cancer Neg Hx           Vital Signs:   Vitals:    06/03/22 0903   BP: 138/80   Pulse: 91   Temp: 97.3 °F (36.3 °C)   SpO2: 99%   Weight: 73.5 kg (162 lb)       Review of Systems   Constitutional: Negative for chills, diaphoresis, fatigue, fever and weight loss.   HENT: Negative for congestion and sore throat.    Respiratory: Negative for cough.    Cardiovascular: Negative for chest pain.   Gastrointestinal: Positive for hematochezia. Negative for abdominal pain, anorexia, change in bowel habit, constipation, diarrhea, flatus, melena, nausea and vomiting.   Genitourinary: Negative for dysuria, frequency and hematuria.   Musculoskeletal: Negative for arthralgias, joint swelling, myalgias and neck pain.   Skin:  Negative for rash.   Neurological: Negative for vertigo, weakness, numbness and headaches.      Physical Exam  Vitals reviewed. Exam conducted with a chaperone present.   Constitutional:       Appearance: Normal appearance. He is well-developed.   HENT:      Head: Normocephalic and atraumatic.      Right Ear: External ear normal.      Left Ear: External ear normal.      Mouth/Throat:      Pharynx: No oropharyngeal exudate.   Eyes:      Conjunctiva/sclera: Conjunctivae normal.      Pupils: Pupils are equal, round, and reactive to light.   Cardiovascular:      Rate and Rhythm: Normal rate and regular rhythm.      Pulses: Normal pulses.      Heart sounds: Normal heart sounds. No murmur heard.    No friction rub. No gallop.   Pulmonary:      Effort: Pulmonary effort is normal.      Breath sounds: Normal breath sounds. No wheezing or rhonchi.   Abdominal:      General: Abdomen is flat. Bowel sounds are normal. There is no distension.      Palpations: Abdomen is soft. There is no mass.      Tenderness: There is no abdominal tenderness. There is no guarding or rebound.      Hernia: No hernia is present.   Genitourinary:     Rectum: Normal.      Comments: No masses palpated in rectum.  Musculoskeletal:         General: Normal range of motion.   Skin:     General: Skin is warm and dry.      Capillary Refill: Capillary refill takes less than 2 seconds.   Neurological:      General: No focal deficit present.      Mental Status: He is alert and oriented to person, place, and time.      Cranial Nerves: No cranial nerve deficit.   Psychiatric:         Mood and Affect: Mood and affect normal.         Behavior: Behavior normal.         Thought Content: Thought content normal.         Judgment: Judgment normal.        Result Review :                 Assessment and Plan    Diagnoses and all orders for this visit:    1. Gastrointestinal hemorrhage associated with gastritis, unspecified gastritis type (Primary)  -     pantoprazole  (Protonix) 40 MG EC tablet; Take 1 tablet by mouth Daily.  Dispense: 30 tablet; Refill: 1  -     CBC Auto Differential  -     Comprehensive Metabolic Panel    2. LLQ abdominal pain  -     CT Abdomen Pelvis With Contrast; Future            Follow Up   Return in about 2 weeks (around 6/17/2022) for Recheck.  Patient was given instructions and counseling regarding his condition or for health maintenance advice. Please see specific information pulled into the AVS if appropriate.   Pt will go straight to ER if he has increase in bleeding or has any dizziness or pain.

## 2022-06-06 ENCOUNTER — HOSPITAL ENCOUNTER (OUTPATIENT)
Dept: CT IMAGING | Facility: HOSPITAL | Age: 47
Discharge: HOME | End: 2022-06-06
Admitting: FAMILY MEDICINE

## 2022-06-06 DIAGNOSIS — R10.32 LLQ ABDOMINAL PAIN: ICD-10-CM

## 2022-06-06 PROCEDURE — 74177 CT ABD & PELVIS W/CONTRAST: CPT

## 2022-06-06 PROCEDURE — 0 IOPAMIDOL PER 1 ML: Performed by: FAMILY MEDICINE

## 2022-06-06 RX ADMIN — IOPAMIDOL 100 ML: 755 INJECTION, SOLUTION INTRAVENOUS at 11:22

## 2022-06-07 ENCOUNTER — TELEPHONE (OUTPATIENT)
Dept: FAMILY MEDICINE CLINIC | Facility: CLINIC | Age: 47
End: 2022-06-07

## 2022-06-07 NOTE — TELEPHONE ENCOUNTER
Caller: Corky Gil    Relationship: Self    Best call back number: [phone]    What is the best time to reach you: ANYTIME     Who are you requesting to speak with (clinical staff, provider,  specific staff member): CLINICAL      What was the call regarding:     THE PATIENT SAID THE PROTONIX 40 MG  WAS DENIED. HE SAID SHE RECEIVED A LETTER FROM THE INSURANCE COMPANY OF THE DENIAL. HE SAID THIS MEDICATION WAS PRESCRIBED BY PCP PABLO   HE IS WANTING PCP TO CALL HIM AND DISCUSS.         Do you require a callback:  YES

## 2022-06-07 NOTE — TELEPHONE ENCOUNTER
I called pt- he will call his pharmacy and have them put in a PA for the med so we know what insurance will cover.

## 2022-06-13 ENCOUNTER — OFFICE VISIT (OUTPATIENT)
Dept: FAMILY MEDICINE CLINIC | Facility: CLINIC | Age: 47
End: 2022-06-13

## 2022-06-13 VITALS
WEIGHT: 165 LBS | DIASTOLIC BLOOD PRESSURE: 82 MMHG | SYSTOLIC BLOOD PRESSURE: 134 MMHG | TEMPERATURE: 97.5 F | BODY MASS INDEX: 29.23 KG/M2 | HEART RATE: 86 BPM | OXYGEN SATURATION: 100 %

## 2022-06-13 DIAGNOSIS — R10.32 LEFT LOWER QUADRANT ABDOMINAL PAIN: ICD-10-CM

## 2022-06-13 DIAGNOSIS — R10.32 LEFT LOWER QUADRANT ABDOMINAL PAIN: Primary | ICD-10-CM

## 2022-06-13 LAB
ALBUMIN SERPL-MCNC: 4.5 G/DL (ref 3.5–5.2)
ALBUMIN/GLOB SERPL: 1.5 G/DL
ALP SERPL-CCNC: 108 U/L (ref 39–117)
ALT SERPL W P-5'-P-CCNC: 24 U/L (ref 1–41)
ANION GAP SERPL CALCULATED.3IONS-SCNC: 9 MMOL/L (ref 5–15)
AST SERPL-CCNC: 18 U/L (ref 1–40)
BASOPHILS # BLD AUTO: 0.08 10*3/MM3 (ref 0–0.2)
BASOPHILS NFR BLD AUTO: 0.8 % (ref 0–1.5)
BILIRUB SERPL-MCNC: 0.2 MG/DL (ref 0–1.2)
BUN SERPL-MCNC: 11 MG/DL (ref 6–20)
BUN/CREAT SERPL: 12.9 (ref 7–25)
CALCIUM SPEC-SCNC: 9.2 MG/DL (ref 8.6–10.5)
CHLORIDE SERPL-SCNC: 108 MMOL/L (ref 98–107)
CO2 SERPL-SCNC: 23 MMOL/L (ref 22–29)
CREAT SERPL-MCNC: 0.85 MG/DL (ref 0.76–1.27)
DEPRECATED RDW RBC AUTO: 39.8 FL (ref 37–54)
EGFRCR SERPLBLD CKD-EPI 2021: 109.2 ML/MIN/1.73
EOSINOPHIL # BLD AUTO: 0.27 10*3/MM3 (ref 0–0.4)
EOSINOPHIL NFR BLD AUTO: 2.8 % (ref 0.3–6.2)
ERYTHROCYTE [DISTWIDTH] IN BLOOD BY AUTOMATED COUNT: 12.3 % (ref 12.3–15.4)
GLOBULIN UR ELPH-MCNC: 3 GM/DL
GLUCOSE SERPL-MCNC: 94 MG/DL (ref 65–99)
HCT VFR BLD AUTO: 44.5 % (ref 37.5–51)
HGB BLD-MCNC: 15 G/DL (ref 13–17.7)
IMM GRANULOCYTES # BLD AUTO: 0.03 10*3/MM3 (ref 0–0.05)
IMM GRANULOCYTES NFR BLD AUTO: 0.3 % (ref 0–0.5)
LYMPHOCYTES # BLD AUTO: 3.57 10*3/MM3 (ref 0.7–3.1)
LYMPHOCYTES NFR BLD AUTO: 37 % (ref 19.6–45.3)
MCH RBC QN AUTO: 30.5 PG (ref 26.6–33)
MCHC RBC AUTO-ENTMCNC: 33.7 G/DL (ref 31.5–35.7)
MCV RBC AUTO: 90.6 FL (ref 79–97)
MONOCYTES # BLD AUTO: 0.79 10*3/MM3 (ref 0.1–0.9)
MONOCYTES NFR BLD AUTO: 8.2 % (ref 5–12)
NEUTROPHILS NFR BLD AUTO: 4.9 10*3/MM3 (ref 1.7–7)
NEUTROPHILS NFR BLD AUTO: 50.9 % (ref 42.7–76)
NRBC BLD AUTO-RTO: 0 /100 WBC (ref 0–0.2)
PLATELET # BLD AUTO: 381 10*3/MM3 (ref 140–450)
PMV BLD AUTO: 10.5 FL (ref 6–12)
POTASSIUM SERPL-SCNC: 4.2 MMOL/L (ref 3.5–5.2)
PROT SERPL-MCNC: 7.5 G/DL (ref 6–8.5)
RBC # BLD AUTO: 4.91 10*6/MM3 (ref 4.14–5.8)
SODIUM SERPL-SCNC: 140 MMOL/L (ref 136–145)
WBC NRBC COR # BLD: 9.64 10*3/MM3 (ref 3.4–10.8)

## 2022-06-13 PROCEDURE — 99213 OFFICE O/P EST LOW 20 MIN: CPT | Performed by: FAMILY MEDICINE

## 2022-06-13 PROCEDURE — 80053 COMPREHEN METABOLIC PANEL: CPT | Performed by: FAMILY MEDICINE

## 2022-06-13 PROCEDURE — 36415 COLL VENOUS BLD VENIPUNCTURE: CPT | Performed by: FAMILY MEDICINE

## 2022-06-13 PROCEDURE — 85025 COMPLETE CBC W/AUTO DIFF WBC: CPT | Performed by: FAMILY MEDICINE

## 2022-06-13 RX ORDER — SUCRALFATE 1 G/1
1 TABLET ORAL 4 TIMES DAILY
Qty: 120 TABLET | Refills: 1 | Status: SHIPPED | OUTPATIENT
Start: 2022-06-13 | End: 2022-08-08

## 2022-06-13 RX ORDER — DICYCLOMINE HYDROCHLORIDE 10 MG/1
10 CAPSULE ORAL
Qty: 120 CAPSULE | Refills: 1 | Status: SHIPPED | OUTPATIENT
Start: 2022-06-13 | End: 2022-12-13

## 2022-06-13 RX ORDER — SUCRALFATE 1 G/1
TABLET ORAL
Qty: 360 TABLET | OUTPATIENT
Start: 2022-06-13

## 2022-06-13 NOTE — PROGRESS NOTES
Chief Complaint  Abdominal Pain (FOLLOW UP )    Subjective          Corky Gil presents to [facility]  Abdominal Pain  This is a recurrent problem. The current episode started more than 1 month ago. The onset quality is sudden. The problem occurs intermittently. The problem has been unchanged. The pain is located in the LLQ. The quality of the pain is aching. Pertinent negatives include no anorexia, arthralgias, belching, constipation, diarrhea, dysuria, fever, flatus, frequency, headaches, hematochezia, hematuria, melena, myalgias, nausea, vomiting or weight loss. He has tried proton pump inhibitors for the symptoms. The treatment provided mild relief.       Objective   Allergies   Allergen Reactions   • Wasp Venom Anaphylaxis   • Naproxen Rash   • Phenytoin Seizure     Immunization History   Administered Date(s) Administered   • Flu Vaccine Quad PF >36MO 08/28/2017   • Flu Vaccine Split Quad 08/28/2017   • Fluzone Split Quad (Multi-dose) 12/02/2018   • Hepatitis B 04/15/2003, 05/21/2003     Past Medical History:   Diagnosis Date   • Neurologic disorder    • Seizures (HCC)       Past Surgical History:   Procedure Laterality Date   • VASECTOMY        Social History     Socioeconomic History   • Marital status:    Tobacco Use   • Smoking status: Current Every Day Smoker     Packs/day: 1.50     Years: 30.00     Pack years: 45.00   • Smokeless tobacco: Never Used   • Tobacco comment: smoked 21-30 years   Vaping Use   • Vaping Use: Never used   Substance and Sexual Activity   • Alcohol use: Yes     Comment: Current some day  rarely drinks, less than 1 drink per day, has been drinking for 11-20 yearrs   • Drug use: Defer   • Sexual activity: Defer        Current Outpatient Medications:   •  carBAMazepine (TEGretol) 200 MG tablet, , Disp: , Rfl:   •  cetirizine (zyrTEC) 10 MG tablet, cetirizine 10 mg oral tablet take 1 tablet (10 mg) by oral route once daily   Active, Disp: ,  Rfl:   •  ibuprofen (ADVIL,MOTRIN) 600 MG tablet, , Disp: , Rfl:   •  pantoprazole (Protonix) 40 MG EC tablet, Take 1 tablet by mouth Daily., Disp: 30 tablet, Rfl: 1  •  Sod Picosulfate-Mag Ox-Cit Acd (Clenpiq) 10-3.5-12 MG-GM -GM/160ML solution, Take 160 mL by mouth Take As Directed., Disp: 320 mL, Rfl: 0  •  dicyclomine (Bentyl) 10 MG capsule, Take 1 capsule by mouth 4 (Four) Times a Day Before Meals & at Bedtime As Needed (PAIN)., Disp: 120 capsule, Rfl: 1  •  sucralfate (Carafate) 1 g tablet, Take 1 tablet by mouth 4 (Four) Times a Day., Disp: 120 tablet, Rfl: 1    Current Facility-Administered Medications:   •  EPINEPHrine (ADRENALIN) injection 0.3 mg, 0.3 mg, Intramuscular, Once, Madut, Nyebol, DO   Family History   Problem Relation Age of Onset   • Cancer Mother    • Diabetes Other    • Heart disease Other    • Colon cancer Neg Hx           Vital Signs:   Vitals:    06/13/22 0816   BP: 134/82   Pulse: 86   Temp: 97.5 °F (36.4 °C)   SpO2: 100%   Weight: 74.8 kg (165 lb)       Review of Systems   Constitutional: Negative for fever and weight loss.   Gastrointestinal: Positive for abdominal pain. Negative for anorexia, constipation, diarrhea, flatus, hematochezia, melena, nausea and vomiting.   Genitourinary: Negative for dysuria, frequency and hematuria.   Musculoskeletal: Negative for arthralgias and myalgias.   Neurological: Negative for headaches.      Physical Exam   Result Review :   The following data was reviewed by: Ernesto Lopez MD on 06/13/2022:  CMP    CMP 3/18/22 6/3/22   Glucose 126 (A) 84   BUN 9 12   Creatinine 0.79 0.79   Sodium 139 139   Potassium 4.4 4.1   Chloride 104 104   Calcium 9.5 9.6   Albumin 4.70 4.80   Total Bilirubin <0.2 0.2   Alkaline Phosphatase 103 118 (A)   AST (SGOT) 18 20   ALT (SGPT) 25 22   (A) Abnormal value            CBC    CBC 3/1/22 3/18/22 6/3/22   WBC 9.36 10.75 9.98   RBC 4.64 4.70 5.04   Hemoglobin 14.3 14.5 15.3   Hematocrit 42.8 43.3 44.4   MCV 92.2 92.1 88.1    MCH 30.8 30.9 30.4   MCHC 33.4 33.5 34.5   RDW 13.2 12.9 12.2 (A)   Platelets 358 346 376   (A) Abnormal value                      Assessment and Plan    Diagnoses and all orders for this visit:    1. Left lower quadrant abdominal pain (Primary)  -     sucralfate (Carafate) 1 g tablet; Take 1 tablet by mouth 4 (Four) Times a Day.  Dispense: 120 tablet; Refill: 1  -     dicyclomine (Bentyl) 10 MG capsule; Take 1 capsule by mouth 4 (Four) Times a Day Before Meals & at Bedtime As Needed (PAIN).  Dispense: 120 capsule; Refill: 1  -     CBC Auto Differential  -     Comprehensive Metabolic Panel            Follow Up   Return if symptoms worsen or fail to improve.  Patient was given instructions and counseling regarding his condition or for health maintenance advice. Please see specific information pulled into the AVS if appropriate.

## 2022-06-13 NOTE — PROGRESS NOTES
Venipuncture Blood Specimen Collection  Venipuncture performed in left arm by Nya Tapia with good hemostasis. Patient tolerated the procedure well without complications.   06/13/22   Nya Tapia

## 2022-07-13 NOTE — PAT
Patient given arrival time of 1000 for the date of 7/15/22.  History, allergies, pharmacy, home medications, and prior surgeries and procedures reviewed with patient.          Patient instructed to hold all medications until after procedure.        Patient stated that the office gave him an arrival time of 0900.  Verified with the patient that his time of arrival on our schedule is 1000.        Educated patient about the procedure and what to expect.  Reinforced pre procedure instructions including bowel prep and clear liquid diet, and went over discharge instructions.  Patient stated understanding and was able to teach back.      PAT rowena.     Kimberlyn Hawley RN 13:36 EDT 7/13/2022

## 2022-07-15 ENCOUNTER — ANESTHESIA (OUTPATIENT)
Dept: GASTROENTEROLOGY | Facility: HOSPITAL | Age: 47
End: 2022-07-15

## 2022-07-15 ENCOUNTER — HOSPITAL ENCOUNTER (OUTPATIENT)
Facility: HOSPITAL | Age: 47
Setting detail: HOSPITAL OUTPATIENT SURGERY
Discharge: HOME | End: 2022-07-15
Attending: INTERNAL MEDICINE | Admitting: INTERNAL MEDICINE

## 2022-07-15 ENCOUNTER — ANESTHESIA EVENT (OUTPATIENT)
Dept: GASTROENTEROLOGY | Facility: HOSPITAL | Age: 47
End: 2022-07-15

## 2022-07-15 VITALS
HEIGHT: 63 IN | RESPIRATION RATE: 15 BRPM | TEMPERATURE: 98.3 F | WEIGHT: 161.38 LBS | HEART RATE: 60 BPM | SYSTOLIC BLOOD PRESSURE: 102 MMHG | OXYGEN SATURATION: 97 % | DIASTOLIC BLOOD PRESSURE: 65 MMHG | BODY MASS INDEX: 28.59 KG/M2

## 2022-07-15 DIAGNOSIS — K62.5 RECTAL BLEED: ICD-10-CM

## 2022-07-15 PROCEDURE — 88305 TISSUE EXAM BY PATHOLOGIST: CPT | Performed by: INTERNAL MEDICINE

## 2022-07-15 PROCEDURE — 45390 COLONOSCOPY W/RESECTION: CPT | Performed by: INTERNAL MEDICINE

## 2022-07-15 PROCEDURE — 25010000002 PROPOFOL 10 MG/ML EMULSION: Performed by: NURSE ANESTHETIST, CERTIFIED REGISTERED

## 2022-07-15 PROCEDURE — 45380 COLONOSCOPY AND BIOPSY: CPT | Performed by: INTERNAL MEDICINE

## 2022-07-15 DEVICE — DEV CLIP ENDO RESOLUTION360 CONTRL ROT 235CM: Type: IMPLANTABLE DEVICE | Site: COLON | Status: FUNCTIONAL

## 2022-07-15 DEVICE — DEV CLIP HEMO RESOLUTION360/ULTR 235CM 17MM STRL: Type: IMPLANTABLE DEVICE | Site: COLON | Status: FUNCTIONAL

## 2022-07-15 RX ORDER — SODIUM CHLORIDE, SODIUM LACTATE, POTASSIUM CHLORIDE, CALCIUM CHLORIDE 600; 310; 30; 20 MG/100ML; MG/100ML; MG/100ML; MG/100ML
30 INJECTION, SOLUTION INTRAVENOUS CONTINUOUS
Status: DISCONTINUED | OUTPATIENT
Start: 2022-07-15 | End: 2022-07-15 | Stop reason: HOSPADM

## 2022-07-15 RX ORDER — LIDOCAINE HYDROCHLORIDE 20 MG/ML
INJECTION, SOLUTION EPIDURAL; INFILTRATION; INTRACAUDAL; PERINEURAL AS NEEDED
Status: DISCONTINUED | OUTPATIENT
Start: 2022-07-15 | End: 2022-07-15 | Stop reason: SURG

## 2022-07-15 RX ORDER — PROPOFOL 10 MG/ML
VIAL (ML) INTRAVENOUS AS NEEDED
Status: DISCONTINUED | OUTPATIENT
Start: 2022-07-15 | End: 2022-07-15 | Stop reason: SURG

## 2022-07-15 RX ADMIN — PROPOFOL 50 MG: 10 INJECTION, EMULSION INTRAVENOUS at 13:59

## 2022-07-15 RX ADMIN — SODIUM CHLORIDE, POTASSIUM CHLORIDE, SODIUM LACTATE AND CALCIUM CHLORIDE 30 ML/HR: 600; 310; 30; 20 INJECTION, SOLUTION INTRAVENOUS at 11:47

## 2022-07-15 RX ADMIN — PROPOFOL 250 MCG/KG/MIN: 10 INJECTION, EMULSION INTRAVENOUS at 13:59

## 2022-07-15 RX ADMIN — LIDOCAINE HYDROCHLORIDE 50 MG: 20 INJECTION, SOLUTION EPIDURAL; INFILTRATION; INTRACAUDAL; PERINEURAL at 13:59

## 2022-07-15 NOTE — H&P
"Pre Procedure History & Physical    Chief Complaint:   Rectal bleeding      Subjective     HPI:   Rectal bleeding    Past Medical History:   Past Medical History:   Diagnosis Date   • Neurologic disorder    • Seizures (HCC)        Past Surgical History:  Past Surgical History:   Procedure Laterality Date   • VASECTOMY         Family History:  Family History   Problem Relation Age of Onset   • Cancer Mother    • Diabetes Other    • Heart disease Other    • Colon cancer Neg Hx        Social History:   reports that he has been smoking. He has a 45.00 pack-year smoking history. He has never used smokeless tobacco. He reports current alcohol use. Drug use questions deferred to the physician.    Medications:   Facility-Administered Medications Prior to Admission   Medication Dose Route Frequency Provider Last Rate Last Admin   • EPINEPHrine (ADRENALIN) injection 0.3 mg  0.3 mg Intramuscular Once Yadira Nance, DO         Medications Prior to Admission   Medication Sig Dispense Refill Last Dose   • carBAMazepine (TEGretol) 200 MG tablet    7/15/2022 at Unknown time   • cetirizine (zyrTEC) 10 MG tablet cetirizine 10 mg oral tablet take 1 tablet (10 mg) by oral route once daily   Active   Past Week at Unknown time   • dicyclomine (Bentyl) 10 MG capsule Take 1 capsule by mouth 4 (Four) Times a Day Before Meals & at Bedtime As Needed (PAIN). 120 capsule 1 7/14/2022 at Unknown time   • ibuprofen (ADVIL,MOTRIN) 600 MG tablet    7/14/2022 at Unknown time   • pantoprazole (Protonix) 40 MG EC tablet Take 1 tablet by mouth Daily. 30 tablet 1 7/14/2022 at Unknown time   • sucralfate (Carafate) 1 g tablet Take 1 tablet by mouth 4 (Four) Times a Day. 120 tablet 1 7/14/2022 at Unknown time       Allergies:  Wasp venom, Naproxen, and Phenytoin        Objective     Blood pressure 132/88, pulse 71, temperature 97.6 °F (36.4 °C), temperature source Temporal, resp. rate 18, height 160 cm (63\"), weight 73.2 kg (161 lb 6 oz), SpO2 97 " %.    Physical Exam   Constitutional: Pt is oriented to person, place, and time and well-developed, well-nourished, and in no distress.   Mouth/Throat: Oropharynx is clear and moist.   Neck: Normal range of motion.   Cardiovascular: Normal rate, regular rhythm and normal heart sounds.    Pulmonary/Chest: Effort normal and breath sounds normal.   Abdominal: Soft. Nontender  Skin: Skin is warm and dry.   Psychiatric: Mood, memory, affect and judgment normal.     Assessment & Plan     Diagnosis:  Rectal bleeding    Anticipated Surgical Procedure:  colonoscopy    The risks, benefits, and alternatives of this procedure have been discussed with the patient or the responsible party- the patient understands and agrees to proceed.

## 2022-07-15 NOTE — ANESTHESIA PREPROCEDURE EVALUATION
Anesthesia Evaluation     Patient summary reviewed and Nursing notes reviewed   no history of anesthetic complications:  NPO Solid Status: > 8 hours  NPO Liquid Status: > 2 hours           Airway   Mallampati: III  TM distance: >3 FB  Possible difficult intubation  Dental    (+) upper dentures        Pulmonary - negative pulmonary ROS and normal exam   Cardiovascular - normal exam    (+) valvular problems/murmurs murmur,       Neuro/Psych  (+) seizures (last seizure 2013. took meds today) well controlled,    GI/Hepatic/Renal/Endo    (+)  GI bleeding lower ,     Musculoskeletal (-) negative ROS    Abdominal  - normal exam   Substance History - negative use     OB/GYN negative ob/gyn ROS         Other - negative ROS                       Anesthesia Plan    ASA 3     general     (Total IV Anesthesia    Patient understands anesthesia not responsible for dental damage.  )  intravenous induction     Anesthetic plan, risks, benefits, and alternatives have been provided, discussed and informed consent has been obtained with: patient.    Plan discussed with CRNA.        CODE STATUS:

## 2022-07-15 NOTE — NURSING NOTE
Patient vitals are stable.  No complaints of pain or discomfort.  Pertinent information/education provided in discharge packet.  Family contacted and educated on discharge instructions and complications that could happen at home.  Patient and family verbalize understanding and are able to perform teach back.      Kimberlyn Hawley RN 14:40 EDT 7/15/2022

## 2022-07-15 NOTE — DISCHARGE INSTRUCTIONS
You may resume normal activity on 7/16/22 at 2:20.  Stay away from greasy, gas producing, and spicy foods today.  Start off with bland foods.  Be near a bathroom when you eat, the bowel prep might still be working it's way through your body.  It is normal to find a little blood on your toilet paper.  IF YOU ARE PASSING BLOOD CLOTS OR FILLING THE TOILET, CALL 911 AND/OR GO TO YOUR NEAREST ER.    NO DRIVING, SIGNING LEGAL DOCUMENTS, OR ALCOHOL CONSUMPTION FOR 24 HOURS.

## 2022-07-19 LAB
CYTO UR: NORMAL
LAB AP CASE REPORT: NORMAL
LAB AP CLINICAL INFORMATION: NORMAL
PATH REPORT.FINAL DX SPEC: NORMAL
PATH REPORT.GROSS SPEC: NORMAL

## 2022-07-26 RX ORDER — HYDROCORTISONE 25 MG/G
CREAM TOPICAL 2 TIMES DAILY
Qty: 28 G | Refills: 1 | Status: SHIPPED | OUTPATIENT
Start: 2022-07-26 | End: 2022-11-09

## 2022-08-03 DIAGNOSIS — K29.71 GASTROINTESTINAL HEMORRHAGE ASSOCIATED WITH GASTRITIS, UNSPECIFIED GASTRITIS TYPE: ICD-10-CM

## 2022-08-03 RX ORDER — PANTOPRAZOLE SODIUM 40 MG/1
40 TABLET, DELAYED RELEASE ORAL DAILY
Qty: 90 TABLET | OUTPATIENT
Start: 2022-08-03

## 2022-08-03 RX ORDER — PANTOPRAZOLE SODIUM 40 MG/1
40 TABLET, DELAYED RELEASE ORAL DAILY
Qty: 30 TABLET | Refills: 0 | Status: SHIPPED | OUTPATIENT
Start: 2022-08-03 | End: 2022-12-13 | Stop reason: SDUPTHER

## 2022-08-07 DIAGNOSIS — R10.32 LEFT LOWER QUADRANT ABDOMINAL PAIN: ICD-10-CM

## 2022-08-08 DIAGNOSIS — R10.32 LEFT LOWER QUADRANT ABDOMINAL PAIN: ICD-10-CM

## 2022-08-08 RX ORDER — SUCRALFATE 1 G/1
TABLET ORAL
Qty: 120 TABLET | Refills: 1 | Status: SHIPPED | OUTPATIENT
Start: 2022-08-08

## 2022-08-08 RX ORDER — SUCRALFATE 1 G/1
TABLET ORAL
Qty: 360 TABLET | OUTPATIENT
Start: 2022-08-08

## 2022-08-31 DIAGNOSIS — K29.71 GASTROINTESTINAL HEMORRHAGE ASSOCIATED WITH GASTRITIS, UNSPECIFIED GASTRITIS TYPE: ICD-10-CM

## 2022-08-31 RX ORDER — PANTOPRAZOLE SODIUM 40 MG/1
40 TABLET, DELAYED RELEASE ORAL DAILY
Qty: 30 TABLET | Refills: 0 | OUTPATIENT
Start: 2022-08-31

## 2022-10-31 ENCOUNTER — OFFICE VISIT (OUTPATIENT)
Dept: FAMILY MEDICINE CLINIC | Facility: CLINIC | Age: 47
End: 2022-10-31

## 2022-10-31 VITALS
TEMPERATURE: 97.3 F | HEART RATE: 85 BPM | WEIGHT: 169.4 LBS | SYSTOLIC BLOOD PRESSURE: 122 MMHG | HEIGHT: 63 IN | DIASTOLIC BLOOD PRESSURE: 70 MMHG | BODY MASS INDEX: 30.02 KG/M2 | OXYGEN SATURATION: 97 %

## 2022-10-31 DIAGNOSIS — R51.9 INTRACTABLE EPISODIC HEADACHE, UNSPECIFIED HEADACHE TYPE: Primary | ICD-10-CM

## 2022-10-31 PROCEDURE — 99213 OFFICE O/P EST LOW 20 MIN: CPT | Performed by: FAMILY MEDICINE

## 2022-10-31 PROCEDURE — 96372 THER/PROPH/DIAG INJ SC/IM: CPT | Performed by: FAMILY MEDICINE

## 2022-10-31 RX ORDER — PROMETHAZINE HYDROCHLORIDE 25 MG/1
25 TABLET ORAL EVERY 6 HOURS PRN
Qty: 10 TABLET | Refills: 0 | Status: SHIPPED | OUTPATIENT
Start: 2022-10-31 | End: 2022-11-09 | Stop reason: SDUPTHER

## 2022-10-31 RX ORDER — SUMATRIPTAN 50 MG/1
TABLET, FILM COATED ORAL
Qty: 9 TABLET | Refills: 0 | Status: SHIPPED | OUTPATIENT
Start: 2022-10-31

## 2022-10-31 RX ORDER — KETOROLAC TROMETHAMINE 30 MG/ML
30 INJECTION, SOLUTION INTRAMUSCULAR; INTRAVENOUS ONCE
Status: COMPLETED | OUTPATIENT
Start: 2022-10-31 | End: 2022-10-31

## 2022-10-31 RX ADMIN — KETOROLAC TROMETHAMINE 30 MG: 30 INJECTION, SOLUTION INTRAMUSCULAR; INTRAVENOUS at 14:53

## 2022-10-31 RX ADMIN — KETOROLAC TROMETHAMINE 30 MG: 30 INJECTION, SOLUTION INTRAMUSCULAR; INTRAVENOUS at 14:54

## 2022-10-31 NOTE — PROGRESS NOTES
"Chief Complaint    Headache (Headache x 3 days.  Has been taking Excedrine, but it's not helping) and Abdominal Pain (Stomach today)    Subjective      Corky Gil presents to [facility]    History of Present Illness    1.) HEAD PAIN : Onset-3 days ago.  Patient reports a history of intermittent headaches.  Unclear whether migraines vs other type of headache.  Reports pain located at the posterior aspect of his head with radiation to the top of his head.  He admits to nausea.  No visual changes.  No sensitivity to sound.  No slurred speech.  No facial droop noted by anyone else.  He also reports that he is experiencing abdominal pain - onset - this AM.     Objective     Vital Signs:     /70 (BP Location: Right arm, Patient Position: Sitting, Cuff Size: Adult)   Pulse 85   Temp 97.3 °F (36.3 °C) (Temporal)   Ht 160 cm (63\")   Wt 76.8 kg (169 lb 6.4 oz)   SpO2 97%   BMI 30.01 kg/m²       Physical Exam  Vitals reviewed.   Constitutional:       General: He is not in acute distress.     Appearance: Normal appearance. He is well-developed.   HENT:      Head: Normocephalic and atraumatic.      Right Ear: Hearing and external ear normal.      Left Ear: Hearing and external ear normal.      Nose: Nose normal. No congestion.      Mouth/Throat:      Pharynx: No oropharyngeal exudate.   Eyes:      General: Lids are normal.         Right eye: No discharge.         Left eye: No discharge.      Extraocular Movements: Extraocular movements intact.      Conjunctiva/sclera: Conjunctivae normal.      Pupils: Pupils are equal, round, and reactive to light.   Cardiovascular:      Rate and Rhythm: Normal rate and regular rhythm.   Pulmonary:      Effort: Pulmonary effort is normal.   Abdominal:      General: There is no distension.   Musculoskeletal:         General: No swelling.      Cervical back: Neck supple. No rigidity.   Skin:     Coloration: Skin is not jaundiced.      Findings: No " erythema.   Neurological:      Mental Status: He is alert. Mental status is at baseline.   Psychiatric:         Mood and Affect: Mood and affect normal.         Thought Content: Thought content normal.     Assessment and Plan     Diagnoses and all orders for this visit:    1. Intractable episodic headache, unspecified headache type (Primary)  Comments:  IM steroid dose as noted. Take Imitrex PRN. Phenergan PRN nasuea.  Orders:  -     ketorolac (TORADOL) injection 30 mg  -     ketorolac (TORADOL) injection 30 mg    Other orders  -     SUMAtriptan (Imitrex) 50 MG tablet; Take one tablet at onset of headache. May repeat dose one time in 2 hours if headache not relieved.  Dispense: 9 tablet; Refill: 0  -     promethazine (PHENERGAN) 25 MG tablet; Take 1 tablet by mouth Every 6 (Six) Hours As Needed for Nausea.  Dispense: 10 tablet; Refill: 0    Follow Up : As needed.     Patient was given instructions and counseling regarding his condition or for health maintenance advice. Please see specific information pulled into the AVS if appropriate.

## 2022-11-02 ENCOUNTER — TELEPHONE (OUTPATIENT)
Dept: FAMILY MEDICINE CLINIC | Facility: CLINIC | Age: 47
End: 2022-11-02

## 2022-11-02 NOTE — TELEPHONE ENCOUNTER
Caller: Corky Gil    Relationship to patient: Self    Best call back number: [phone]    New or established patient?  [] New  [x] Established    Date of discharge: 11/1/22    Facility discharged from: [facility]    Diagnosis/Symptoms: PAIN IN HEAD AND HEADACHES    Length of stay (If applicable): 1 DAY    Specialty Only: Did you see a [facility] health provider?    [] Yes  [x] No

## 2022-11-05 DIAGNOSIS — R10.32 LEFT LOWER QUADRANT ABDOMINAL PAIN: ICD-10-CM

## 2022-11-05 RX ORDER — SUCRALFATE 1 G/1
TABLET ORAL
Qty: 120 TABLET | Refills: 1 | OUTPATIENT
Start: 2022-11-05

## 2022-11-09 ENCOUNTER — OFFICE VISIT (OUTPATIENT)
Dept: FAMILY MEDICINE CLINIC | Facility: CLINIC | Age: 47
End: 2022-11-09

## 2022-11-09 VITALS
DIASTOLIC BLOOD PRESSURE: 75 MMHG | BODY MASS INDEX: 30.29 KG/M2 | SYSTOLIC BLOOD PRESSURE: 125 MMHG | HEART RATE: 113 BPM | TEMPERATURE: 97.5 F | OXYGEN SATURATION: 97 % | WEIGHT: 171 LBS

## 2022-11-09 DIAGNOSIS — G44.82 ORGASMIC HEADACHE: Primary | ICD-10-CM

## 2022-11-09 DIAGNOSIS — R10.10 PAIN OF UPPER ABDOMEN: ICD-10-CM

## 2022-11-09 DIAGNOSIS — R11.0 NAUSEA: ICD-10-CM

## 2022-11-09 PROCEDURE — 99214 OFFICE O/P EST MOD 30 MIN: CPT | Performed by: NURSE PRACTITIONER

## 2022-11-09 RX ORDER — BUTALBITAL, ACETAMINOPHEN AND CAFFEINE 50; 325; 40 MG/1; MG/1; MG/1
1 TABLET ORAL EVERY 6 HOURS PRN
Qty: 30 TABLET | Refills: 0 | Status: SHIPPED | OUTPATIENT
Start: 2022-11-09

## 2022-11-09 RX ORDER — CARBAMAZEPINE 200 MG/1
400 TABLET, EXTENDED RELEASE ORAL 2 TIMES DAILY
COMMUNITY
Start: 2022-10-20

## 2022-11-09 RX ORDER — METOCLOPRAMIDE 5 MG/1
TABLET ORAL
COMMUNITY
Start: 2022-11-02 | End: 2022-11-09 | Stop reason: ALTCHOICE

## 2022-11-09 RX ORDER — PROMETHAZINE HYDROCHLORIDE 25 MG/1
25 TABLET ORAL EVERY 6 HOURS PRN
Qty: 10 TABLET | Refills: 0 | Status: SHIPPED | OUTPATIENT
Start: 2022-11-09 | End: 2022-12-13 | Stop reason: SDUPTHER

## 2022-11-09 NOTE — PROGRESS NOTES
Chief Complaint  Headache (Follow-up from [facility] for headache since 10/29/2022 )    Subjective            Corky Gil presents to [facility]  History of Present Illness  Pt started with the original HA 10/28-29/22 with intercourse  and then was seen here in the primary care and given the toradol shot and the imitrex and anti-nausea med and the toradol only decrease to tolerate--then persistent HA and then pt finally had to call EMS and then during that time the HA was horrible and BP as elevated--R/T pain and then seen in the ER dept 11/1/22 at [facility] and then they did labs and CT neck and head and then with morphine Tx pt of course was better symptom wise--and then was DC to home with the HA tolerable-    Then the next day and then made the appoint but could not be seen until today--also taken excedrine as well    Continued pain        PHQ-2 Total Score:    PHQ-9 Total Score:      Past Medical History:   Diagnosis Date   • Headache    • Neurologic disorder    • Seizures (HCC)        Allergies   Allergen Reactions   • Wasp Venom Anaphylaxis   • Naproxen Rash   • Phenytoin Seizure        Past Surgical History:   Procedure Laterality Date   • COLONOSCOPY N/A 7/15/2022    Procedure: COLONOSCOPY WITH ORISE INJECTED, POLYPECTOMY/HOT SNARE, CLIPS APPLIED X6;  Surgeon: Oleg Brady MD;  Location: [facility] ENDOSCOPY;  Service: Gastroenterology;  Laterality: N/A;  COLON POLYP, HEMORRHOIDS   • VASECTOMY          Social History     Tobacco Use   • Smoking status: Every Day     Packs/day: 1.50     Years: 30.00     Pack years: 45.00     Types: Cigarettes   • Smokeless tobacco: Never   • Tobacco comments:     smoked 21-30 years   Vaping Use   • Vaping Use: Never used   Substance Use Topics   • Alcohol use: Yes     Comment: Current some day  rarely drinks, less than 1 drink per day, has been drinking for 11-20 yearrs   • Drug use: Defer       Family History   Problem Relation Age  of Onset   • Cancer Mother    • Diabetes Other    • Heart disease Other    • Colon cancer Neg Hx         Health Maintenance Due   Topic Date Due   • COVID-19 Vaccine (1) Never done   • Pneumococcal Vaccine 0-64 (1 - PCV) Never done   • TDAP/TD VACCINES (1 - Tdap) Never done   • HEPATITIS C SCREENING  Never done   • INFLUENZA VACCINE  08/01/2022        Current Outpatient Medications on File Prior to Visit   Medication Sig   • carBAMazepine XR (TEGretol  XR) 200 MG 12 hr tablet    • cetirizine (zyrTEC) 10 MG tablet cetirizine 10 mg oral tablet take 1 tablet (10 mg) by oral route once daily   Active   • dicyclomine (Bentyl) 10 MG capsule Take 1 capsule by mouth 4 (Four) Times a Day Before Meals & at Bedtime As Needed (PAIN).   • pantoprazole (PROTONIX) 40 MG EC tablet TAKE 1 TABLET BY MOUTH DAILY   • sucralfate (CARAFATE) 1 g tablet TAKE 1 TABLET BY MOUTH FOUR TIMES DAILY   • SUMAtriptan (Imitrex) 50 MG tablet Take one tablet at onset of headache. May repeat dose one time in 2 hours if headache not relieved.     Current Facility-Administered Medications on File Prior to Visit   Medication   • EPINEPHrine (ADRENALIN) injection 0.3 mg       Immunization History   Administered Date(s) Administered   • Flu Vaccine Quad PF >36MO 08/28/2017   • Flu Vaccine Split Quad 08/28/2017   • Fluzone Quad >6mos (Multi-dose) 12/02/2018   • Hepatitis A 12/02/2018   • Hepatitis B 04/15/2003, 05/21/2003       Review of Systems   Constitutional: Negative for chills and fever.   HENT: Negative for congestion, sinus pressure and sore throat.    Eyes: Negative for blurred vision, double vision and photophobia.   Respiratory: Negative for cough.    Gastrointestinal: Positive for abdominal pain and nausea. Negative for vomiting.   Genitourinary: Negative for dysuria.   Allergic/Immunologic: Positive for environmental allergies.   Neurological: Positive for headache. Negative for seizures and syncope.   Psychiatric/Behavioral: Negative for  self-injury and suicidal ideas.        Objective     /75 (BP Location: Right arm, Patient Position: Sitting, Cuff Size: Adult)   Pulse 113   Temp 97.5 °F (36.4 °C) (Infrared)   Wt 77.6 kg (171 lb)   SpO2 97%   BMI 30.29 kg/m²       Physical Exam  Vitals and nursing note reviewed.   Constitutional:       Appearance: Normal appearance.   HENT:      Head: Normocephalic.      Right Ear: External ear normal.      Left Ear: External ear normal.      Nose: Nose normal.   Eyes:      Pupils: Pupils are equal, round, and reactive to light.   Cardiovascular:      Rate and Rhythm: Normal rate and regular rhythm.      Heart sounds: Normal heart sounds.   Pulmonary:      Effort: Pulmonary effort is normal.      Breath sounds: Normal breath sounds.   Abdominal:      Palpations: Abdomen is soft.      Tenderness: There is abdominal tenderness in the right upper quadrant and left upper quadrant.   Musculoskeletal:         General: Normal range of motion.      Cervical back: Normal range of motion and neck supple.   Skin:     General: Skin is warm and dry.   Neurological:      Mental Status: He is alert and oriented to person, place, and time.   Psychiatric:         Mood and Affect: Mood normal.         Behavior: Behavior normal.         Thought Content: Thought content normal.         Judgment: Judgment normal.         Result Review :             Office Visit with Yadira Nance DO (10/31/2022)  EMERGENCY DEPART/[facility] - SCAN - ER NOTE, [facility], 11/01/2022 (11/01/2022)  COLONOSCOPY (07/15/2022 13:20)               Assessment and Plan      Diagnoses and all orders for this visit:    1. Orgasmic headache (Primary)  -     Ambulatory Referral to Neurology  -     MRI Brain With & Without Contrast; Future  -     butalbital-acetaminophen-caffeine (Esgic) -40 MG per tablet; Take 1 tablet by mouth Every 6 (Six) Hours As Needed for Headache or Migraine.  Dispense: 30 tablet; Refill: 0  -     MRI Brain Without  Contrast; Future    2. Pain of upper abdomen  -     Ambulatory Referral to Gastroenterology    3. Nausea  -     Ambulatory Referral to Gastroenterology  -     promethazine (PHENERGAN) 25 MG tablet; Take 1 tablet by mouth Every 6 (Six) Hours As Needed for Nausea.  Dispense: 10 tablet; Refill: 0    pt already on bentyl and protonix and phenergan and carafate --referral to gastro may need EGD    Then with regards to the orgasmic HA that is not gone--and been to ER dept we will refer him back to Dr Bonilla and in meantime do the PRN for the worst HA the fioricet and then get the MRI           Follow Up     Return if symptoms worsen or fail to improve.

## 2022-12-06 ENCOUNTER — HOSPITAL ENCOUNTER (OUTPATIENT)
Dept: MRI IMAGING | Facility: HOSPITAL | Age: 47
Discharge: HOME | End: 2022-12-06
Admitting: NURSE PRACTITIONER

## 2022-12-06 DIAGNOSIS — G44.82 ORGASMIC HEADACHE: ICD-10-CM

## 2022-12-06 PROCEDURE — 70551 MRI BRAIN STEM W/O DYE: CPT

## 2022-12-06 NOTE — PROGRESS NOTES
Please mail letter to patient stating    Corky the MRI of the brain without contrast shows no acute intercranial abnormality but we will still proceed with referral to neurology based on symptoms

## 2022-12-12 NOTE — H&P (VIEW-ONLY)
Chief Complaint   Abd. Pain/recatal bleed     History of Present Illness       Corky Gil is a 47 y.o. male who presents to [facility] for follow-up with a history of rectal bleeding, abdominal pain, personal history of colon polyps, reflux, nausea, internal hemorrhoids with a history of colon polyps.  Patient reports over the past 2 years that he has had rectal bleeding that has worsened.  He reports abdominal pain in the right and left upper quadrant that is intermittent 2-3 times per day Bentyl does provide relief.  He reports intermittent rectal bleeding that is bright red to maroon in color that ranges from 2 to 5 days with bowel movements.  He reports bowel movements are anywhere from 1 to 3/day.  He denies vomiting.  Reports nausea on a regular basis.  He is currently on Protonix and Carafate and is needing a refill on Protonix.  He denies melena or fevers.  Patient is unsure of family history related to colon cancer.    Colonoscopy: Review of the patient's most recent colonoscopy performed by Dr. Brady on 07.15.2022 15 mm polyp in the ascending colon, 3 mm polyp in the rectum, internal hemorrhoids.  Repeat colonoscopy 1 year.  Tubular adenoma on biopsy.    CT abdomen and pelvis w/contrast 11.12.2022 Borderline dilatation of a few small bowel loops in the LEFT upper quadrant, likely representing the proximal jejunum. No surrounding fat stranding is seen to suggest an acute inflammatory process. This finding is highly nonspecific and may just represent peristaltic activity, however, a developing enteritis of this region is not entirely excluded.    Most recent labs on 11.12.2022 see below.       Results       Result Review :       CMP    CMP 3/18/22 6/3/22 6/13/22   Glucose 126 (A) 84 94   BUN 9 12 11   Creatinine 0.79 0.79 0.85   Sodium 139 139 140   Potassium 4.4 4.1 4.2   Chloride 104 104 108 (A)   Calcium 9.5 9.6 9.2   Albumin 4.70 4.80 4.50   Total Bilirubin <0.2  0.2 0.2   Alkaline Phosphatase 103 118 (A) 108   AST (SGOT) 18 20 18   ALT (SGPT) 25 22 24   (A) Abnormal value            CBC    CBC 3/18/22 6/3/22 6/13/22   WBC 10.75 9.98 9.64   RBC 4.70 5.04 4.91   Hemoglobin 14.5 15.3 15.0   Hematocrit 43.3 44.4 44.5   MCV 92.1 88.1 90.6   MCH 30.9 30.4 30.5   MCHC 33.5 34.5 33.7   RDW 12.9 12.2 (A) 12.3   Platelets 346 376 381   (A) Abnormal value                          Past Medical History       Past Medical History:   Diagnosis Date   • GI (gastrointestinal bleed) Couple years ago    Has gotten worse as the years go on.   • Headache    • Neurologic disorder    • Seizures (HCC)        Past Surgical History:   Procedure Laterality Date   • COLONOSCOPY N/A 7/15/2022    Procedure: COLONOSCOPY WITH ORISE INJECTED, POLYPECTOMY/HOT SNARE, CLIPS APPLIED X6;  Surgeon: Oleg Brady MD;  Location: [facility] ENDOSCOPY;  Service: Gastroenterology;  Laterality: N/A;  COLON POLYP, HEMORRHOIDS   • VASECTOMY           Current Outpatient Medications:   •  butalbital-acetaminophen-caffeine (Esgic) -40 MG per tablet, Take 1 tablet by mouth Every 6 (Six) Hours As Needed for Headache or Migraine., Disp: 30 tablet, Rfl: 0  •  carBAMazepine XR (TEGretol  XR) 200 MG 12 hr tablet, , Disp: , Rfl:   •  cetirizine (zyrTEC) 10 MG tablet, cetirizine 10 mg oral tablet take 1 tablet (10 mg) by oral route once daily   Active, Disp: , Rfl:   •  pantoprazole (PROTONIX) 40 MG EC tablet, Take 1 tablet by mouth Daily., Disp: 30 tablet, Rfl: 2  •  promethazine (PHENERGAN) 25 MG tablet, Take 1 tablet by mouth Every 6 (Six) Hours As Needed for Nausea., Disp: 10 tablet, Rfl: 0  •  sucralfate (CARAFATE) 1 g tablet, TAKE 1 TABLET BY MOUTH FOUR TIMES DAILY, Disp: 120 tablet, Rfl: 1  •  SUMAtriptan (Imitrex) 50 MG tablet, Take one tablet at onset of headache. May repeat dose one time in 2 hours if headache not relieved., Disp: 9 tablet, Rfl: 0  •  dicyclomine (BENTYL) 20 MG tablet, Take 1 tablet by mouth  "Every 6 (Six) Hours., Disp: 90 tablet, Rfl: 3  •  Sod Picosulfate-Mag Ox-Cit Acd (Clenpiq) 10-3.5-12 MG-GM -GM/160ML solution, Take 160 mL by mouth 1 (One) Time for 1 dose., Disp: 320 mL, Rfl: 0    Current Facility-Administered Medications:   •  EPINEPHrine (ADRENALIN) injection 0.3 mg, 0.3 mg, Intramuscular, Once, Madut, Nyebol, DO     Allergies   Allergen Reactions   • Wasp Venom Anaphylaxis   • Naproxen Rash   • Phenytoin Seizure       Family History   Problem Relation Age of Onset   • Cancer Mother    • Diabetes Other    • Heart disease Other         Social History     Social History Narrative   • Not on file       Objective     Vital Signs:   /86 (BP Location: Right arm, Patient Position: Sitting, Cuff Size: Adult)   Pulse 87   Ht 162.6 cm (64\")   Wt 83.5 kg (184 lb)   SpO2 100%   BMI 31.58 kg/m²       Physical Exam  Constitutional:       General: He is not in acute distress.     Appearance: Normal appearance. He is well-developed and normal weight.   Eyes:      Conjunctiva/sclera: Conjunctivae normal.      Pupils: Pupils are equal, round, and reactive to light.      Visual Fields: Right eye visual fields normal and left eye visual fields normal.   Cardiovascular:      Rate and Rhythm: Normal rate and regular rhythm.      Heart sounds: Normal heart sounds.   Pulmonary:      Effort: Pulmonary effort is normal. No retractions.      Breath sounds: Normal breath sounds and air entry.      Comments: Inspection of chest: normal appearance  Abdominal:      General: Bowel sounds are normal.      Palpations: Abdomen is soft.      Tenderness: There is abdominal tenderness in the right upper quadrant, epigastric area and left upper quadrant.      Comments: No appreciable hepatosplenomegaly   Musculoskeletal:      Cervical back: Neck supple.      Right lower leg: No edema.      Left lower leg: No edema.   Lymphadenopathy:      Cervical: No cervical adenopathy.   Skin:     Findings: No lesion.      Comments: " Turgor normal   Neurological:      Mental Status: He is alert and oriented to person, place, and time.   Psychiatric:         Mood and Affect: Mood and affect normal.           Assessment & Plan          Assessment and Plan    Diagnoses and all orders for this visit:    1. Generalized abdominal pain (Primary)    2. Rectal bleeding    3. Personal history of colonic polyps    4. Gastrointestinal hemorrhage associated with gastritis, unspecified gastritis type  -     pantoprazole (PROTONIX) 40 MG EC tablet; Take 1 tablet by mouth Daily.  Dispense: 30 tablet; Refill: 2    5. Nausea  -     promethazine (PHENERGAN) 25 MG tablet; Take 1 tablet by mouth Every 6 (Six) Hours As Needed for Nausea.  Dispense: 10 tablet; Refill: 0    6. Hemorrhoids, unspecified hemorrhoid type    7. Gastroesophageal reflux disease, unspecified whether esophagitis present    Other orders  -     dicyclomine (BENTYL) 20 MG tablet; Take 1 tablet by mouth Every 6 (Six) Hours.  Dispense: 90 tablet; Refill: 3  -     Sod Picosulfate-Mag Ox-Cit Acd (Clenpiq) 10-3.5-12 MG-GM -GM/160ML solution; Take 160 mL by mouth 1 (One) Time for 1 dose.  Dispense: 320 mL; Refill: 0      47-year-old male presenting the office today  for follow-up with a history of rectal bleeding, abdominal pain, personal history of colon polyps, reflux, nausea, internal hemorrhoids with a history of colon polyps. I have recommended that the patient undergo further evaluation with an EGD and colonoscopy.  I have discussed this procedure in detail with the patient.  I have discussed the risks, benefits and alternatives.  I have discussed the risk of anesthesia, bleeding and perforation.  Patient understands these risks, benefits and alternatives and wishes to proceed.  I will schedule him at his earliest convenience.  I have provided refills on the patient's Protonix and Phenergan at this time as this is providing good relief for reflux and nausea.  I have increased his dose of Bentyl  from 10 mg to 20 mg every 6 hours as needed for abdominal pain.  We will follow-up in the office after endoscopy.  Patient agreeable to this plan will call with any questions or concerns            Follow Up       Follow Up   Return for Follow up after endoscopy in office.  Patient was given instructions and counseling regarding his condition or for health maintenance advice. Please see specific information pulled into the AVS if appropriate.

## 2022-12-12 NOTE — PROGRESS NOTES
Chief Complaint   Abd. Pain/recatal bleed     History of Present Illness       Corky Gil is a 47 y.o. male who presents to [facility] for follow-up with a history of rectal bleeding, abdominal pain, personal history of colon polyps, reflux, nausea, internal hemorrhoids with a history of colon polyps.  Patient reports over the past 2 years that he has had rectal bleeding that has worsened.  He reports abdominal pain in the right and left upper quadrant that is intermittent 2-3 times per day Bentyl does provide relief.  He reports intermittent rectal bleeding that is bright red to maroon in color that ranges from 2 to 5 days with bowel movements.  He reports bowel movements are anywhere from 1 to 3/day.  He denies vomiting.  Reports nausea on a regular basis.  He is currently on Protonix and Carafate and is needing a refill on Protonix.  He denies melena or fevers.  Patient is unsure of family history related to colon cancer.    Colonoscopy: Review of the patient's most recent colonoscopy performed by Dr. Brady on 07.15.2022 15 mm polyp in the ascending colon, 3 mm polyp in the rectum, internal hemorrhoids.  Repeat colonoscopy 1 year.  Tubular adenoma on biopsy.    CT abdomen and pelvis w/contrast 11.12.2022 Borderline dilatation of a few small bowel loops in the LEFT upper quadrant, likely representing the proximal jejunum. No surrounding fat stranding is seen to suggest an acute inflammatory process. This finding is highly nonspecific and may just represent peristaltic activity, however, a developing enteritis of this region is not entirely excluded.    Most recent labs on 11.12.2022 see below.       Results       Result Review :       CMP    CMP 3/18/22 6/3/22 6/13/22   Glucose 126 (A) 84 94   BUN 9 12 11   Creatinine 0.79 0.79 0.85   Sodium 139 139 140   Potassium 4.4 4.1 4.2   Chloride 104 104 108 (A)   Calcium 9.5 9.6 9.2   Albumin 4.70 4.80 4.50   Total Bilirubin <0.2  0.2 0.2   Alkaline Phosphatase 103 118 (A) 108   AST (SGOT) 18 20 18   ALT (SGPT) 25 22 24   (A) Abnormal value            CBC    CBC 3/18/22 6/3/22 6/13/22   WBC 10.75 9.98 9.64   RBC 4.70 5.04 4.91   Hemoglobin 14.5 15.3 15.0   Hematocrit 43.3 44.4 44.5   MCV 92.1 88.1 90.6   MCH 30.9 30.4 30.5   MCHC 33.5 34.5 33.7   RDW 12.9 12.2 (A) 12.3   Platelets 346 376 381   (A) Abnormal value                          Past Medical History       Past Medical History:   Diagnosis Date   • GI (gastrointestinal bleed) Couple years ago    Has gotten worse as the years go on.   • Headache    • Neurologic disorder    • Seizures (HCC)        Past Surgical History:   Procedure Laterality Date   • COLONOSCOPY N/A 7/15/2022    Procedure: COLONOSCOPY WITH ORISE INJECTED, POLYPECTOMY/HOT SNARE, CLIPS APPLIED X6;  Surgeon: Oleg Brady MD;  Location: [facility] ENDOSCOPY;  Service: Gastroenterology;  Laterality: N/A;  COLON POLYP, HEMORRHOIDS   • VASECTOMY           Current Outpatient Medications:   •  butalbital-acetaminophen-caffeine (Esgic) -40 MG per tablet, Take 1 tablet by mouth Every 6 (Six) Hours As Needed for Headache or Migraine., Disp: 30 tablet, Rfl: 0  •  carBAMazepine XR (TEGretol  XR) 200 MG 12 hr tablet, , Disp: , Rfl:   •  cetirizine (zyrTEC) 10 MG tablet, cetirizine 10 mg oral tablet take 1 tablet (10 mg) by oral route once daily   Active, Disp: , Rfl:   •  pantoprazole (PROTONIX) 40 MG EC tablet, Take 1 tablet by mouth Daily., Disp: 30 tablet, Rfl: 2  •  promethazine (PHENERGAN) 25 MG tablet, Take 1 tablet by mouth Every 6 (Six) Hours As Needed for Nausea., Disp: 10 tablet, Rfl: 0  •  sucralfate (CARAFATE) 1 g tablet, TAKE 1 TABLET BY MOUTH FOUR TIMES DAILY, Disp: 120 tablet, Rfl: 1  •  SUMAtriptan (Imitrex) 50 MG tablet, Take one tablet at onset of headache. May repeat dose one time in 2 hours if headache not relieved., Disp: 9 tablet, Rfl: 0  •  dicyclomine (BENTYL) 20 MG tablet, Take 1 tablet by mouth  "Every 6 (Six) Hours., Disp: 90 tablet, Rfl: 3  •  Sod Picosulfate-Mag Ox-Cit Acd (Clenpiq) 10-3.5-12 MG-GM -GM/160ML solution, Take 160 mL by mouth 1 (One) Time for 1 dose., Disp: 320 mL, Rfl: 0    Current Facility-Administered Medications:   •  EPINEPHrine (ADRENALIN) injection 0.3 mg, 0.3 mg, Intramuscular, Once, Madut, Nyebol, DO     Allergies   Allergen Reactions   • Wasp Venom Anaphylaxis   • Naproxen Rash   • Phenytoin Seizure       Family History   Problem Relation Age of Onset   • Cancer Mother    • Diabetes Other    • Heart disease Other         Social History     Social History Narrative   • Not on file       Objective     Vital Signs:   /86 (BP Location: Right arm, Patient Position: Sitting, Cuff Size: Adult)   Pulse 87   Ht 162.6 cm (64\")   Wt 83.5 kg (184 lb)   SpO2 100%   BMI 31.58 kg/m²       Physical Exam  Constitutional:       General: He is not in acute distress.     Appearance: Normal appearance. He is well-developed and normal weight.   Eyes:      Conjunctiva/sclera: Conjunctivae normal.      Pupils: Pupils are equal, round, and reactive to light.      Visual Fields: Right eye visual fields normal and left eye visual fields normal.   Cardiovascular:      Rate and Rhythm: Normal rate and regular rhythm.      Heart sounds: Normal heart sounds.   Pulmonary:      Effort: Pulmonary effort is normal. No retractions.      Breath sounds: Normal breath sounds and air entry.      Comments: Inspection of chest: normal appearance  Abdominal:      General: Bowel sounds are normal.      Palpations: Abdomen is soft.      Tenderness: There is abdominal tenderness in the right upper quadrant, epigastric area and left upper quadrant.      Comments: No appreciable hepatosplenomegaly   Musculoskeletal:      Cervical back: Neck supple.      Right lower leg: No edema.      Left lower leg: No edema.   Lymphadenopathy:      Cervical: No cervical adenopathy.   Skin:     Findings: No lesion.      Comments: " Turgor normal   Neurological:      Mental Status: He is alert and oriented to person, place, and time.   Psychiatric:         Mood and Affect: Mood and affect normal.           Assessment & Plan          Assessment and Plan    Diagnoses and all orders for this visit:    1. Generalized abdominal pain (Primary)    2. Rectal bleeding    3. Personal history of colonic polyps    4. Gastrointestinal hemorrhage associated with gastritis, unspecified gastritis type  -     pantoprazole (PROTONIX) 40 MG EC tablet; Take 1 tablet by mouth Daily.  Dispense: 30 tablet; Refill: 2    5. Nausea  -     promethazine (PHENERGAN) 25 MG tablet; Take 1 tablet by mouth Every 6 (Six) Hours As Needed for Nausea.  Dispense: 10 tablet; Refill: 0    6. Hemorrhoids, unspecified hemorrhoid type    7. Gastroesophageal reflux disease, unspecified whether esophagitis present    Other orders  -     dicyclomine (BENTYL) 20 MG tablet; Take 1 tablet by mouth Every 6 (Six) Hours.  Dispense: 90 tablet; Refill: 3  -     Sod Picosulfate-Mag Ox-Cit Acd (Clenpiq) 10-3.5-12 MG-GM -GM/160ML solution; Take 160 mL by mouth 1 (One) Time for 1 dose.  Dispense: 320 mL; Refill: 0      47-year-old male presenting the office today  for follow-up with a history of rectal bleeding, abdominal pain, personal history of colon polyps, reflux, nausea, internal hemorrhoids with a history of colon polyps. I have recommended that the patient undergo further evaluation with an EGD and colonoscopy.  I have discussed this procedure in detail with the patient.  I have discussed the risks, benefits and alternatives.  I have discussed the risk of anesthesia, bleeding and perforation.  Patient understands these risks, benefits and alternatives and wishes to proceed.  I will schedule him at his earliest convenience.  I have provided refills on the patient's Protonix and Phenergan at this time as this is providing good relief for reflux and nausea.  I have increased his dose of Bentyl  from 10 mg to 20 mg every 6 hours as needed for abdominal pain.  We will follow-up in the office after endoscopy.  Patient agreeable to this plan will call with any questions or concerns            Follow Up       Follow Up   Return for Follow up after endoscopy in office.  Patient was given instructions and counseling regarding his condition or for health maintenance advice. Please see specific information pulled into the AVS if appropriate.

## 2022-12-13 ENCOUNTER — PREP FOR SURGERY (OUTPATIENT)
Dept: OTHER | Facility: HOSPITAL | Age: 47
End: 2022-12-13

## 2022-12-13 ENCOUNTER — OFFICE VISIT (OUTPATIENT)
Dept: GASTROENTEROLOGY | Facility: CLINIC | Age: 47
End: 2022-12-13

## 2022-12-13 VITALS
WEIGHT: 184 LBS | OXYGEN SATURATION: 100 % | HEART RATE: 87 BPM | BODY MASS INDEX: 31.41 KG/M2 | SYSTOLIC BLOOD PRESSURE: 144 MMHG | DIASTOLIC BLOOD PRESSURE: 86 MMHG | HEIGHT: 64 IN

## 2022-12-13 DIAGNOSIS — R11.0 NAUSEA: ICD-10-CM

## 2022-12-13 DIAGNOSIS — R10.84 GENERALIZED ABDOMINAL PAIN: Primary | ICD-10-CM

## 2022-12-13 DIAGNOSIS — Z86.010 PERSONAL HISTORY OF COLONIC POLYPS: ICD-10-CM

## 2022-12-13 DIAGNOSIS — K62.5 RECTAL BLEEDING: ICD-10-CM

## 2022-12-13 DIAGNOSIS — K64.9 HEMORRHOIDS, UNSPECIFIED HEMORRHOID TYPE: ICD-10-CM

## 2022-12-13 DIAGNOSIS — K29.71 GASTROINTESTINAL HEMORRHAGE ASSOCIATED WITH GASTRITIS, UNSPECIFIED GASTRITIS TYPE: ICD-10-CM

## 2022-12-13 DIAGNOSIS — K21.9 GASTROESOPHAGEAL REFLUX DISEASE, UNSPECIFIED WHETHER ESOPHAGITIS PRESENT: ICD-10-CM

## 2022-12-13 PROCEDURE — 99214 OFFICE O/P EST MOD 30 MIN: CPT | Performed by: NURSE PRACTITIONER

## 2022-12-13 RX ORDER — DICYCLOMINE HCL 20 MG
20 TABLET ORAL EVERY 6 HOURS
Qty: 90 TABLET | Refills: 3 | Status: SHIPPED | OUTPATIENT
Start: 2022-12-13

## 2022-12-13 RX ORDER — PROMETHAZINE HYDROCHLORIDE 25 MG/1
25 TABLET ORAL EVERY 6 HOURS PRN
Qty: 10 TABLET | Refills: 0 | Status: SHIPPED | OUTPATIENT
Start: 2022-12-13 | End: 2023-03-15

## 2022-12-13 RX ORDER — PANTOPRAZOLE SODIUM 40 MG/1
40 TABLET, DELAYED RELEASE ORAL DAILY
Qty: 30 TABLET | Refills: 2 | Status: SHIPPED | OUTPATIENT
Start: 2022-12-13 | End: 2023-03-10

## 2022-12-13 RX ORDER — SODIUM PICOSULFATE, MAGNESIUM OXIDE, AND ANHYDROUS CITRIC ACID 10; 3.5; 12 MG/160ML; G/160ML; G/160ML
160 LIQUID ORAL ONCE
Qty: 320 ML | Refills: 0 | Status: SHIPPED | OUTPATIENT
Start: 2022-12-13 | End: 2022-12-13

## 2022-12-22 ENCOUNTER — ANESTHESIA (OUTPATIENT)
Dept: GASTROENTEROLOGY | Facility: HOSPITAL | Age: 47
End: 2022-12-22

## 2022-12-22 ENCOUNTER — ANESTHESIA EVENT (OUTPATIENT)
Dept: GASTROENTEROLOGY | Facility: HOSPITAL | Age: 47
End: 2022-12-22

## 2022-12-22 ENCOUNTER — HOSPITAL ENCOUNTER (OUTPATIENT)
Facility: HOSPITAL | Age: 47
Setting detail: HOSPITAL OUTPATIENT SURGERY
Discharge: HOME | End: 2022-12-22
Attending: INTERNAL MEDICINE | Admitting: INTERNAL MEDICINE

## 2022-12-22 VITALS
HEIGHT: 64 IN | DIASTOLIC BLOOD PRESSURE: 87 MMHG | HEART RATE: 72 BPM | SYSTOLIC BLOOD PRESSURE: 126 MMHG | TEMPERATURE: 97.2 F | BODY MASS INDEX: 27.93 KG/M2 | RESPIRATION RATE: 14 BRPM | OXYGEN SATURATION: 93 % | WEIGHT: 163.58 LBS

## 2022-12-22 DIAGNOSIS — K64.9 HEMORRHOIDS, UNSPECIFIED HEMORRHOID TYPE: ICD-10-CM

## 2022-12-22 DIAGNOSIS — R10.84 GENERALIZED ABDOMINAL PAIN: ICD-10-CM

## 2022-12-22 DIAGNOSIS — K62.5 RECTAL BLEEDING: ICD-10-CM

## 2022-12-22 DIAGNOSIS — R11.0 NAUSEA: ICD-10-CM

## 2022-12-22 DIAGNOSIS — Z86.010 PERSONAL HISTORY OF COLONIC POLYPS: ICD-10-CM

## 2022-12-22 DIAGNOSIS — K29.71 GASTROINTESTINAL HEMORRHAGE ASSOCIATED WITH GASTRITIS, UNSPECIFIED GASTRITIS TYPE: ICD-10-CM

## 2022-12-22 PROCEDURE — 25010000002 PROPOFOL 10 MG/ML EMULSION: Performed by: NURSE ANESTHETIST, CERTIFIED REGISTERED

## 2022-12-22 PROCEDURE — 43239 EGD BIOPSY SINGLE/MULTIPLE: CPT | Performed by: INTERNAL MEDICINE

## 2022-12-22 PROCEDURE — 88305 TISSUE EXAM BY PATHOLOGIST: CPT | Performed by: INTERNAL MEDICINE

## 2022-12-22 PROCEDURE — 45385 COLONOSCOPY W/LESION REMOVAL: CPT | Performed by: INTERNAL MEDICINE

## 2022-12-22 RX ORDER — PROPOFOL 10 MG/ML
VIAL (ML) INTRAVENOUS AS NEEDED
Status: DISCONTINUED | OUTPATIENT
Start: 2022-12-22 | End: 2022-12-22 | Stop reason: SURG

## 2022-12-22 RX ORDER — LIDOCAINE HYDROCHLORIDE 20 MG/ML
INJECTION, SOLUTION EPIDURAL; INFILTRATION; INTRACAUDAL; PERINEURAL AS NEEDED
Status: DISCONTINUED | OUTPATIENT
Start: 2022-12-22 | End: 2022-12-22 | Stop reason: SURG

## 2022-12-22 RX ORDER — SODIUM CHLORIDE, SODIUM LACTATE, POTASSIUM CHLORIDE, CALCIUM CHLORIDE 600; 310; 30; 20 MG/100ML; MG/100ML; MG/100ML; MG/100ML
30 INJECTION, SOLUTION INTRAVENOUS CONTINUOUS
Status: DISCONTINUED | OUTPATIENT
Start: 2022-12-22 | End: 2022-12-22 | Stop reason: HOSPADM

## 2022-12-22 RX ADMIN — LIDOCAINE HYDROCHLORIDE 40 MG: 20 INJECTION, SOLUTION EPIDURAL; INFILTRATION; INTRACAUDAL; PERINEURAL at 08:28

## 2022-12-22 RX ADMIN — PROPOFOL 250 MCG/KG/MIN: 10 INJECTION, EMULSION INTRAVENOUS at 08:28

## 2022-12-22 RX ADMIN — SODIUM CHLORIDE, POTASSIUM CHLORIDE, SODIUM LACTATE AND CALCIUM CHLORIDE 30 ML/HR: 600; 310; 30; 20 INJECTION, SOLUTION INTRAVENOUS at 08:01

## 2022-12-22 RX ADMIN — PROPOFOL 50 MG: 10 INJECTION, EMULSION INTRAVENOUS at 08:30

## 2022-12-22 NOTE — ANESTHESIA POSTPROCEDURE EVALUATION
Patient: Corky Gil    Procedure Summary     Date: 12/22/22 Room / Location: [facility] ENDOSCOPY 2 / [facility] ENDOSCOPY    Anesthesia Start: 0827 Anesthesia Stop: 0900    Procedures:       ESOPHAGOGASTRODUODENOSCOPY with biopsies      COLONOSCOPY with polkypectomy with cold snare Diagnosis:       Generalized abdominal pain      Rectal bleeding      Personal history of colonic polyps      Gastrointestinal hemorrhage associated with gastritis, unspecified gastritis type      Nausea      Hemorrhoids, unspecified hemorrhoid type      (Generalized abdominal pain [R10.84])      (Rectal bleeding [K62.5])      (Personal history of colonic polyps [Z86.010])      (Gastrointestinal hemorrhage associated with gastritis, unspecified gastritis type [K29.71])      (Nausea [R11.0])      (Hemorrhoids, unspecified hemorrhoid type [K64.9])    Surgeons: Oleg Brady MD Provider: Reyes, Mirabelle, DO    Anesthesia Type: general ASA Status: 2          Anesthesia Type: general    Vitals  Vitals Value Taken Time   /77 12/22/22 0903   Temp 36.8 °C (98.2 °F) 12/22/22 0858   Pulse 65 12/22/22 0904   Resp 14 12/22/22 0858   SpO2 91 % 12/22/22 0904   Vitals shown include unvalidated device data.        Post Anesthesia Care and Evaluation    Patient location during evaluation: bedside  Patient participation: complete - patient participated  Level of consciousness: awake  Pain management: adequate    Airway patency: patent  Anesthetic complications: No anesthetic complications  PONV Status: none  Cardiovascular status: acceptable and stable  Respiratory status: acceptable  Hydration status: acceptable    Comments: An Anesthesiologist personally participated in the most demanding procedures (including induction and emergence if applicable) in the anesthesia plan, monitored the course of anesthesia administration at frequent intervals and remained physically present and available for immediate diagnosis and treatment of emergencies.

## 2022-12-22 NOTE — ANESTHESIA PREPROCEDURE EVALUATION
Anesthesia Evaluation     Patient summary reviewed and Nursing notes reviewed   no history of anesthetic complications:  NPO Solid Status: > 8 hours  NPO Liquid Status: > 2 hours           Airway   Mallampati: II  TM distance: >3 FB  Neck ROM: full  No difficulty expected  Dental    (+) upper dentures    Pulmonary - normal exam    breath sounds clear to auscultation  (+) a smoker Current, sleep apnea,   Cardiovascular - normal exam  Exercise tolerance: good (4-7 METS)    Rhythm: regular  Rate: normal    (+) valvular problems/murmurs,       Neuro/Psych  (+) seizures well controlled, headaches,    GI/Hepatic/Renal/Endo    (+)  GI bleeding lower resolved,     Musculoskeletal (-) negative ROS    Abdominal    Substance History - negative use     OB/GYN negative ob/gyn ROS         Other - negative ROS       ROS/Med Hx Other: PAT Nursing Notes unavailable.                   Anesthesia Plan    ASA 2     general     (Patient understands anesthesia not responsible for dental damage.)  intravenous induction     Anesthetic plan, risks, benefits, and alternatives have been provided, discussed and informed consent has been obtained with: patient.  Pre-procedure education provided  Use of blood products discussed with patient .   Plan discussed with CRNA.        CODE STATUS:

## 2022-12-27 ENCOUNTER — TELEPHONE (OUTPATIENT)
Dept: GASTROENTEROLOGY | Facility: CLINIC | Age: 47
End: 2022-12-27

## 2022-12-27 NOTE — TELEPHONE ENCOUNTER
Called patient, no answer. Left voicemail and call back number ----- Message from JW Kaye sent at 12/27/2022  1:09 PM EST -----  I have reviewed the patient colonoscopy and pathology report. Patient has tubular adenoma polyp(s) on pathology report. It is recommended the patient be on a 3 year recall. Please place in the recall system.    Mild chronic duodenitis and reactive gastropathy. I have reviewed upper endoscopy. Negative results for H. Pylori, metaplasia, dysplasia and malignancy.

## 2023-01-03 ENCOUNTER — TELEPHONE (OUTPATIENT)
Dept: GASTROENTEROLOGY | Facility: CLINIC | Age: 48
End: 2023-01-03
Payer: COMMERCIAL

## 2023-01-03 NOTE — TELEPHONE ENCOUNTER
----- Message from JW Kaye sent at 12/27/2022  1:09 PM EST -----  I have reviewed the patient colonoscopy and pathology report. Patient has tubular adenoma polyp(s) on pathology report. It is recommended the patient be on a 3 year recall. Please place in the recall system.    Mild chronic duodenitis and reactive gastropathy. I have reviewed upper endoscopy. Negative results for H. Pylori, metaplasia, dysplasia and malignancy.

## 2023-03-10 DIAGNOSIS — K29.71 GASTROINTESTINAL HEMORRHAGE ASSOCIATED WITH GASTRITIS, UNSPECIFIED GASTRITIS TYPE: ICD-10-CM

## 2023-03-10 RX ORDER — PANTOPRAZOLE SODIUM 40 MG/1
40 TABLET, DELAYED RELEASE ORAL DAILY
Qty: 30 TABLET | Refills: 2 | Status: SHIPPED | OUTPATIENT
Start: 2023-03-10

## 2023-03-15 ENCOUNTER — OFFICE VISIT (OUTPATIENT)
Dept: FAMILY MEDICINE CLINIC | Facility: CLINIC | Age: 48
End: 2023-03-15
Payer: COMMERCIAL

## 2023-03-15 VITALS
WEIGHT: 171.4 LBS | HEART RATE: 115 BPM | BODY MASS INDEX: 29.26 KG/M2 | TEMPERATURE: 100.7 F | OXYGEN SATURATION: 98 % | HEIGHT: 64 IN

## 2023-03-15 DIAGNOSIS — B34.9 VIRAL ILLNESS: Primary | ICD-10-CM

## 2023-03-15 DIAGNOSIS — U07.1 COVID-19 VIRUS DETECTED: ICD-10-CM

## 2023-03-15 LAB
EXPIRATION DATE: ABNORMAL
EXPIRATION DATE: NORMAL
FLUAV AG NPH QL: NEGATIVE
FLUBV AG NPH QL: NEGATIVE
INTERNAL CONTROL: ABNORMAL
INTERNAL CONTROL: NORMAL
Lab: ABNORMAL
Lab: NORMAL
SARS-COV-2 AG UPPER RESP QL IA.RAPID: DETECTED

## 2023-03-15 PROCEDURE — 99213 OFFICE O/P EST LOW 20 MIN: CPT | Performed by: FAMILY MEDICINE

## 2023-03-15 PROCEDURE — 87426 SARSCOV CORONAVIRUS AG IA: CPT | Performed by: FAMILY MEDICINE

## 2023-03-15 PROCEDURE — 87804 INFLUENZA ASSAY W/OPTIC: CPT | Performed by: FAMILY MEDICINE

## 2023-03-15 RX ORDER — AMOXICILLIN AND CLAVULANATE POTASSIUM 500; 125 MG/1; MG/1
1 TABLET, FILM COATED ORAL 2 TIMES DAILY
Qty: 10 TABLET | Refills: 0 | Status: SHIPPED | OUTPATIENT
Start: 2023-03-15 | End: 2023-03-20

## 2023-03-15 RX ORDER — BENZONATATE 200 MG/1
200 CAPSULE ORAL 3 TIMES DAILY PRN
Qty: 30 CAPSULE | Refills: 0 | Status: SHIPPED | OUTPATIENT
Start: 2023-03-15

## 2023-03-15 RX ORDER — PREDNISONE 20 MG/1
40 TABLET ORAL DAILY
Qty: 14 TABLET | Refills: 0 | Status: SHIPPED | OUTPATIENT
Start: 2023-03-15 | End: 2023-03-22

## 2023-03-15 RX ORDER — SODIUM PICOSULFATE, MAGNESIUM OXIDE, AND ANHYDROUS CITRIC ACID 10; 3.5; 12 MG/160ML; G/160ML; G/160ML
LIQUID ORAL
COMMUNITY
Start: 2022-12-14

## 2023-03-15 RX ORDER — DEXTROMETHORPHAN HYDROBROMIDE AND PROMETHAZINE HYDROCHLORIDE 15; 6.25 MG/5ML; MG/5ML
5 SYRUP ORAL NIGHTLY PRN
Qty: 118 ML | Refills: 0 | Status: SHIPPED | OUTPATIENT
Start: 2023-03-15

## 2023-03-15 NOTE — PROGRESS NOTES
"Chief Complaint    Fever (Fever, body aches, chills, nasal congestion, headache x 2 days)    Subjective      Corky Gil presents to [facility]    History of Present Illness    1.) ACUTE ILLNESS : Onset - 2 days ago.  Per pt - fevers at home.  Patient is also complaining of myalgia, chills and sinus/nasal congestion.  Intermittent head pain.  20 9+) year history of smoking, since age 15 - smoke up to 1 PPD.     Objective     Vital Signs:     Pulse 115   Temp (!) 100.7 °F (38.2 °C) (Temporal)   Ht 162.6 cm (64\")   Wt 77.7 kg (171 lb 6.4 oz)   SpO2 98%   BMI 29.42 kg/m²       Physical Exam  Vitals reviewed.   Constitutional:       General: He is not in acute distress.     Appearance: Normal appearance. He is well-developed.   HENT:      Head: Normocephalic and atraumatic.      Right Ear: Hearing and external ear normal. Tympanic membrane is not erythematous or bulging.      Left Ear: Hearing and external ear normal. Tympanic membrane is not erythematous or bulging.      Nose: Nose normal. No rhinorrhea.      Mouth/Throat:      Pharynx: No oropharyngeal exudate.   Eyes:      General: Lids are normal.         Right eye: No discharge.         Left eye: No discharge.      Conjunctiva/sclera: Conjunctivae normal.   Pulmonary:      Effort: Pulmonary effort is normal. No respiratory distress.      Breath sounds: No stridor. No wheezing, rhonchi or rales.   Abdominal:      General: There is no distension.   Musculoskeletal:         General: No swelling.      Cervical back: Neck supple.   Skin:     Coloration: Skin is not jaundiced.      Findings: No erythema.   Neurological:      Mental Status: He is alert. Mental status is at baseline.   Psychiatric:         Mood and Affect: Mood and affect normal.         Thought Content: Thought content normal.     Assessment and Plan     Diagnoses and all orders for this visit:    1. Viral illness (Primary)  Comments:  Due to history of smoking - " will treat as a COPD exacerbation. Start medications as noted below. If any alarming sxs, contact ofc.   Orders:  -     Cancel: POCT rapid strep A  -     POCT SARS-CoV-2 Antigen SREEDHAR  -     POCT Influenza A/B    2. COVID-19 virus detected  Comments:  Quarantine guidelines discussed with patient. Provided with excuse for work.    Other orders  -     amoxicillin-clavulanate (Augmentin) 500-125 MG per tablet; Take 1 tablet by mouth 2 (Two) Times a Day for 5 days.  Dispense: 10 tablet; Refill: 0  -     predniSONE (DELTASONE) 20 MG tablet; Take 2 tablets by mouth Daily for 7 days.  Dispense: 14 tablet; Refill: 0  -     promethazine-dextromethorphan (PROMETHAZINE-DM) 6.25-15 MG/5ML syrup; Take 5 mL by mouth At Night As Needed for Cough.  Dispense: 118 mL; Refill: 0  -     benzonatate (TESSALON) 200 MG capsule; Take 1 capsule by mouth 3 (Three) Times a Day As Needed for Cough.  Dispense: 30 capsule; Refill: 0    Follow Up     Return if symptoms worsen or fail to improve.    Patient was given instructions and counseling regarding his condition or for health maintenance advice. Please see specific information pulled into the AVS if appropriate.

## 2023-05-01 ENCOUNTER — OFFICE VISIT (OUTPATIENT)
Dept: FAMILY MEDICINE CLINIC | Facility: CLINIC | Age: 48
End: 2023-05-01
Payer: OTHER MISCELLANEOUS

## 2023-05-01 VITALS
BODY MASS INDEX: 28.66 KG/M2 | TEMPERATURE: 97.7 F | OXYGEN SATURATION: 98 % | WEIGHT: 172 LBS | HEART RATE: 80 BPM | SYSTOLIC BLOOD PRESSURE: 138 MMHG | HEIGHT: 65 IN | DIASTOLIC BLOOD PRESSURE: 88 MMHG

## 2023-05-01 DIAGNOSIS — S61.022A: Primary | ICD-10-CM

## 2023-05-01 DIAGNOSIS — S61.032A PUNCTURE WOUND OF LEFT THUMB, INITIAL ENCOUNTER: ICD-10-CM

## 2023-05-01 DIAGNOSIS — Z23 NEED FOR VACCINATION AGAINST HEPATITIS A: ICD-10-CM

## 2023-05-01 RX ORDER — DOXYCYCLINE HYCLATE 100 MG/1
100 CAPSULE ORAL 2 TIMES DAILY
Qty: 14 CAPSULE | Refills: 0 | Status: SHIPPED | OUTPATIENT
Start: 2023-05-01 | End: 2023-05-08

## 2023-05-01 NOTE — PROGRESS NOTES
Chief Complaint  Laceration (Cut left thumb this morning on sour rake at work )    Subjective          Corky Gil presents to [facility]  History of Present Illness  Pt has puncture wound left thumb that occurred today when a  rake sharp point punctured his thumb- pt needs second hepatitis A shot and TDAP      Objective   Allergies   Allergen Reactions   • Wasp Venom Anaphylaxis   • Naproxen Rash   • Phenytoin Seizure     Dilantin       Immunization History   Administered Date(s) Administered   • Flu Vaccine Quad PF >36MO 08/28/2017   • Flu Vaccine Split Quad 08/28/2017   • FluLaval/Fluzone >6mos 08/28/2017   • Fluzone Quad >6mos (Multi-dose) 12/02/2018   • Hepatitis A 12/02/2018   • Hepatitis B Adult/Adolescent IM 04/15/2003, 05/21/2003   • Tdap 05/01/2023     Past Medical History:   Diagnosis Date   • GI (gastrointestinal bleed) Couple years ago    Has gotten worse as the years go on.   • Headache    • Migraines    • Neurologic disorder    • Seasonal allergies    • Seizures     Last seizure was approximately 2010   • Sleep apnea     does not wear a machine      Past Surgical History:   Procedure Laterality Date   • COLONOSCOPY N/A 7/15/2022    Procedure: COLONOSCOPY WITH ORISE INJECTED, POLYPECTOMY/HOT SNARE, CLIPS APPLIED X6;  Surgeon: Oleg Brady MD;  Location: [facility] ENDOSCOPY;  Service: Gastroenterology;  Laterality: N/A;  COLON POLYP, HEMORRHOIDS   • COLONOSCOPY N/A 12/22/2022    Procedure: COLONOSCOPY with polkypectomy with cold snare;  Surgeon: Oleg Brady MD;  Location: [facility] ENDOSCOPY;  Service: Gastroenterology;  Laterality: N/A;  colon polyp, hemorrhoids    • ENDOSCOPY N/A 12/22/2022    Procedure: ESOPHAGOGASTRODUODENOSCOPY with biopsies;  Surgeon: Oleg Brady MD;  Location: [facility] ENDOSCOPY;  Service: Gastroenterology;  Laterality: N/A;  gastritis    • VASECTOMY        Social History     Socioeconomic History   • Marital  status:    Tobacco Use   • Smoking status: Every Day     Packs/day: 1.00     Years: 30.00     Pack years: 30.00     Types: Cigarettes     Start date: 5/15/1992   • Smokeless tobacco: Current     Types: Snuff   • Tobacco comments:     smoked 21-30 years     Some smokeless    Vaping Use   • Vaping Use: Never used   Substance and Sexual Activity   • Alcohol use: Not Currently   • Drug use: Never   • Sexual activity: Yes     Partners: Female     Birth control/protection: Condom, Vasectomy        Current Outpatient Medications:   •  benzonatate (TESSALON) 200 MG capsule, Take 1 capsule by mouth 3 (Three) Times a Day As Needed for Cough., Disp: 30 capsule, Rfl: 0  •  butalbital-acetaminophen-caffeine (Esgic) -40 MG per tablet, Take 1 tablet by mouth Every 6 (Six) Hours As Needed for Headache or Migraine., Disp: 30 tablet, Rfl: 0  •  carBAMazepine XR (TEGretol  XR) 200 MG 12 hr tablet, Take 2 tablets by mouth 2 (Two) Times a Day., Disp: , Rfl:   •  cetirizine (zyrTEC) 10 MG tablet, Take 1 tablet by mouth As Needed., Disp: , Rfl:   •  Clenpiq 10-3.5-12 MG-GM -GM/160ML solution, TAKE AS DIRECTED PER DOCTOR OFFICE, Disp: , Rfl:   •  dicyclomine (BENTYL) 20 MG tablet, Take 1 tablet by mouth Every 6 (Six) Hours., Disp: 90 tablet, Rfl: 3  •  pantoprazole (PROTONIX) 40 MG EC tablet, TAKE 1 TABLET BY MOUTH DAILY, Disp: 30 tablet, Rfl: 2  •  promethazine-dextromethorphan (PROMETHAZINE-DM) 6.25-15 MG/5ML syrup, Take 5 mL by mouth At Night As Needed for Cough., Disp: 118 mL, Rfl: 0  •  sucralfate (CARAFATE) 1 g tablet, TAKE 1 TABLET BY MOUTH FOUR TIMES DAILY, Disp: 120 tablet, Rfl: 1  •  SUMAtriptan (Imitrex) 50 MG tablet, Take one tablet at onset of headache. May repeat dose one time in 2 hours if headache not relieved., Disp: 9 tablet, Rfl: 0  •  doxycycline (VIBRAMYCIN) 100 MG capsule, Take 1 capsule by mouth 2 (Two) Times a Day for 7 days., Disp: 14 capsule, Rfl: 0    Current Facility-Administered Medications:   •   "EPINEPHrine (ADRENALIN) injection 0.3 mg, 0.3 mg, Intramuscular, Once, Yadira Nance, DO  •  hepatitis A (HAVRIX) vaccine 1,440 Units, 1,440 Units, Intramuscular, During Hospitalization, Ernesto Lopez MD   Family History   Problem Relation Age of Onset   • Cancer Mother    • Diabetes Other    • Heart disease Other    • Malig Hyperthermia Neg Hx           Vital Signs:   Vitals:    05/01/23 0907 05/01/23 0920   BP: 138/90 138/88   Pulse: 80    Temp: 97.7 °F (36.5 °C)    SpO2: 98%    Weight: 78 kg (172 lb)    Height: 163.8 cm (64.5\")        Review of Systems   Physical Exam  Vitals reviewed.   Constitutional:       Appearance: Normal appearance. He is well-developed.   HENT:      Head: Normocephalic and atraumatic.      Right Ear: External ear normal.      Left Ear: External ear normal.      Mouth/Throat:      Pharynx: No oropharyngeal exudate.   Eyes:      Conjunctiva/sclera: Conjunctivae normal.      Pupils: Pupils are equal, round, and reactive to light.   Cardiovascular:      Rate and Rhythm: Normal rate and regular rhythm.      Pulses: Normal pulses.      Heart sounds: Normal heart sounds. No murmur heard.    No friction rub. No gallop.   Pulmonary:      Effort: Pulmonary effort is normal.      Breath sounds: Normal breath sounds. No wheezing or rhonchi.   Abdominal:      General: Bowel sounds are normal. There is no distension.      Palpations: Abdomen is soft.      Tenderness: There is no abdominal tenderness.   Skin:     General: Skin is warm and dry.      Capillary Refill: Capillary refill takes less than 2 seconds.      Comments: Left thumb has 2mm puncture wound- non-tender, no redness, warmth, or swelling, hemostasis achieved.   Neurological:      General: No focal deficit present.      Mental Status: He is alert and oriented to person, place, and time.      Cranial Nerves: No cranial nerve deficit.   Psychiatric:         Mood and Affect: Mood and affect normal.         Behavior: Behavior normal.    "      Thought Content: Thought content normal.         Judgment: Judgment normal.        Result Review :                 Assessment and Plan    Diagnoses and all orders for this visit:    1. Laceration of left thumb with foreign body, nail damage status unspecified, initial encounter (Primary)  -     Tdap Vaccine Greater Than or Equal To 6yo IM    2. Puncture wound of left thumb, initial encounter  -     doxycycline (VIBRAMYCIN) 100 MG capsule; Take 1 capsule by mouth 2 (Two) Times a Day for 7 days.  Dispense: 14 capsule; Refill: 0    3. Need for vaccination against hepatitis A  -     hepatitis A (HAVRIX) vaccine 1,440 Units            Follow Up   Return if symptoms worsen or fail to improve.  Patient was given instructions and counseling regarding his condition or for health maintenance advice. Please see specific information pulled into the AVS if appropriate.     Pt told to wear sunscreen when out in sun and taking doxycycline in order to prevent sun burn.

## 2023-06-05 DIAGNOSIS — K29.71 GASTROINTESTINAL HEMORRHAGE ASSOCIATED WITH GASTRITIS, UNSPECIFIED GASTRITIS TYPE: ICD-10-CM

## 2023-06-05 RX ORDER — PANTOPRAZOLE SODIUM 40 MG/1
40 TABLET, DELAYED RELEASE ORAL DAILY
Qty: 30 TABLET | Refills: 2 | Status: SHIPPED | OUTPATIENT
Start: 2023-06-05

## 2023-06-20 NOTE — ANESTHESIA POSTPROCEDURE EVALUATION
Continue with physical therapy home exercise program and at the gym.   Patient: Corky Gil    Procedure Summary     Date: 07/15/22 Room / Location: [facility] ENDOSCOPY 2 / [facility] ENDOSCOPY    Anesthesia Start: 1357 Anesthesia Stop: 1415    Procedure: COLONOSCOPY WITH ORISE INJECTED, POLYPECTOMY/HOT SNARE, CLIPS APPLIED X6 (N/A ) Diagnosis:       Rectal bleed      (Rectal bleed [K62.5])    Surgeons: Oleg Brady MD Provider: Dino Juan MD    Anesthesia Type: general ASA Status: 3          Anesthesia Type: general    Vitals  Vitals Value Taken Time   /85 07/15/22 1439   Temp 36.8 °C (98.3 °F) 07/15/22 1435   Pulse 65 07/15/22 1441   Resp 15 07/15/22 1435   SpO2 96 % 07/15/22 1441   Vitals shown include unvalidated device data.        Post Anesthesia Care and Evaluation    Patient location during evaluation: bedside  Patient participation: complete - patient participated  Level of consciousness: awake  Pain score: 0  Pain management: adequate    Airway patency: patent  Anesthetic complications: No anesthetic complications  PONV Status: none  Cardiovascular status: acceptable and stable  Respiratory status: acceptable and room air  Hydration status: acceptable    Comments: An Anesthesiologist personally participated in the most demanding procedures (including induction and emergence if applicable) in the anesthesia plan, monitored the course of anesthesia administration at frequent intervals and remained physically present and available for immediate diagnosis and treatment of emergencies.

## 2023-07-24 ENCOUNTER — TELEPHONE (OUTPATIENT)
Dept: FAMILY MEDICINE CLINIC | Facility: CLINIC | Age: 48
End: 2023-07-24
Payer: COMMERCIAL

## 2023-07-24 NOTE — TELEPHONE ENCOUNTER
Caller: Coryk Gil    Relationship: Self    Best call back number: [phone]    What is the best time to reach you: ANYTIME    Who are you requesting to speak with (clinical staff, provider,  specific staff member): CLINICAL      What was the call regarding: THE PATIENT WOULD LIKE A CALL BACK WITH UPDATE ON REFERRALS FOR ORTHOPEDIC DOCTOR AND AN MRI

## 2023-07-24 NOTE — TELEPHONE ENCOUNTER
Caller: Corky Gil    Relationship: Self    Best call back number: [phone]     What form or medical record are you requesting: LETTER EXTENDING NOTE TO BE OFF UNTIL RESULTS ARE BACK FROM MRI.     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 PATIENT IS NEEDING UPDATED NOTE EXTENDING TIME OFF UNTIL RESULTS FROM MRI ARE BACK

## 2023-07-28 ENCOUNTER — TELEPHONE (OUTPATIENT)
Dept: FAMILY MEDICINE CLINIC | Facility: CLINIC | Age: 48
End: 2023-07-28
Payer: COMMERCIAL

## 2023-08-01 ENCOUNTER — TELEPHONE (OUTPATIENT)
Dept: FAMILY MEDICINE CLINIC | Facility: CLINIC | Age: 48
End: 2023-08-01
Payer: COMMERCIAL

## 2023-08-03 ENCOUNTER — OFFICE VISIT (OUTPATIENT)
Dept: FAMILY MEDICINE CLINIC | Facility: CLINIC | Age: 48
End: 2023-08-03
Payer: OTHER MISCELLANEOUS

## 2023-08-03 VITALS
SYSTOLIC BLOOD PRESSURE: 132 MMHG | OXYGEN SATURATION: 99 % | WEIGHT: 164.4 LBS | TEMPERATURE: 97.1 F | HEART RATE: 86 BPM | DIASTOLIC BLOOD PRESSURE: 86 MMHG | BODY MASS INDEX: 27.78 KG/M2

## 2023-08-03 DIAGNOSIS — M25.511 ACUTE PAIN OF RIGHT SHOULDER: Primary | ICD-10-CM

## 2023-08-10 ENCOUNTER — TELEPHONE (OUTPATIENT)
Dept: FAMILY MEDICINE CLINIC | Facility: CLINIC | Age: 48
End: 2023-08-10
Payer: COMMERCIAL

## 2023-08-10 NOTE — TELEPHONE ENCOUNTER
OBIE CAME INTO THE OFFICE SAYING THAT PHYSICAL THERAPY ([facility]) PUT HIM OFF FOR ANOTHER WEEK. HE SAID THAT PT TOLD HIM THAT YOU WOULD HAVE TO GIVE HIM A NOTE. THEY DO NOT GIVE WORK NOTES.

## 2023-08-31 ENCOUNTER — OFFICE VISIT (OUTPATIENT)
Dept: ORTHOPEDIC SURGERY | Facility: CLINIC | Age: 48
End: 2023-08-31
Payer: OTHER MISCELLANEOUS

## 2023-08-31 VITALS — BODY MASS INDEX: 27.32 KG/M2 | OXYGEN SATURATION: 98 % | HEIGHT: 65 IN | WEIGHT: 164 LBS

## 2023-08-31 DIAGNOSIS — M25.511 RIGHT SHOULDER PAIN, UNSPECIFIED CHRONICITY: Primary | ICD-10-CM

## 2023-08-31 DIAGNOSIS — S49.91XA INJURY OF RIGHT SHOULDER, INITIAL ENCOUNTER: ICD-10-CM

## 2023-08-31 NOTE — PROGRESS NOTES
"Chief Complaint  Pain and Initial Evaluation of the Right Shoulder     Subjective      Corky Gil presents to [facility] for initial evaluation of the right shoulder.  He was at work and pushing the mud back to get to the water main and hit a rock.  The injury was about a month ago.  He states his shoulder is feeling better now.  He went to  and had X rays.  He went to PCP and he was put off work and is now back to work.  He went to physical therapy in [place].  He is going in tomorrow for his last visit.  PCP ordered a MRI and workman's comp denied.  He has occasional pain.     Allergies   Allergen Reactions    Wasp Venom Anaphylaxis    Naproxen Rash    Phenytoin Seizure     Dilantin          Social History     Socioeconomic History    Marital status:    Tobacco Use    Smoking status: Every Day     Packs/day: 1.00     Years: 30.00     Pack years: 30.00     Types: Cigarettes     Start date: 5/15/1992     Passive exposure: Current    Smokeless tobacco: Current     Types: Snuff    Tobacco comments:     smoked 21-30 years     Some smokeless    Vaping Use    Vaping Use: Never used   Substance and Sexual Activity    Alcohol use: Not Currently    Drug use: Never    Sexual activity: Yes     Partners: Female     Birth control/protection: Condom, Vasectomy        I reviewed the patient's chief complaint, history of present illness, review of systems, past medical history, surgical history, family history, social history, medications, and allergy list.     Review of Systems     Constitutional: Denies fevers, chills, weight loss  Cardiovascular: Denies chest pain, shortness of breath  Skin: Denies rashes, acute skin changes  Neurologic: Denies headache, loss of consciousness        Vital Signs:   Ht 163.8 cm (64.5\")   Wt 74.4 kg (164 lb)   SpO2 98%   BMI 27.72 kg/mý          Physical Exam  General: Alert. No acute distress    Ortho Exam        RIGHT SHOULDER Forward flexion " 180. Abduction 180. External rotation 60. Internal rotation T3. Negative Cross body adduction. Supraspinatus strength 5/5. Infraspinatus Strength 5/5. Infrared subscap 5/5. Negative Valdivia. Negative Neer. Negative Apprehension. Negative Lift off. (Negative Obriens. Sensation intact to light touch, median, radial, ulnar nerve. Positive AIN, PIN, ulnar nerve motor. Positive pulses. Negative Impingement signs. Good strength in triceps, biceps, deltoid, wrist extensors and wrist flexors.      Procedures      Imaging Results (Most Recent)       None             Result Review :     Narrative & Impression   PROCEDURE:  XR SHOULDER 2+ VW RIGHT     COMPARISON: None     INDICATIONS:  jarring injury and now a pulling sensation in shoulder     FINDINGS:          Bony structures are intact and in normal alignment.  The joint spaces and articular surfaces are   preserved.     IMPRESSION:               Normal right shoulder             Assessment and Plan     Diagnoses and all orders for this visit:    1. Right shoulder pain, unspecified chronicity (Primary)    2. Injury of right shoulder, initial encounter        Discussed the treatment plan with the patient. I reviewed the X-rays that were obtained today with the patient.     Work note given to release for full duty.       Educated on risk of smoking. Discussed options for smoking cessation. and Call or return if worsening symptoms.    Follow Up     PRN      Patient was given instructions and counseling regarding his condition or for health maintenance advice. Please see specific information pulled into the AVS if appropriate.     Scribed for Hafsa Barcenas MD by Janette Bonilla MA.  08/31/23   15:19 EDT    I have personally performed the services described in this document as scribed by the above individual and it is both accurate and complete. Hafsa Barcenas MD 08/31/23

## 2023-09-16 DIAGNOSIS — K29.71 GASTROINTESTINAL HEMORRHAGE ASSOCIATED WITH GASTRITIS, UNSPECIFIED GASTRITIS TYPE: ICD-10-CM

## 2023-09-18 RX ORDER — PANTOPRAZOLE SODIUM 40 MG/1
40 TABLET, DELAYED RELEASE ORAL DAILY
Qty: 30 TABLET | Refills: 2 | Status: SHIPPED | OUTPATIENT
Start: 2023-09-18

## 2023-12-19 DIAGNOSIS — K29.71 GASTROINTESTINAL HEMORRHAGE ASSOCIATED WITH GASTRITIS, UNSPECIFIED GASTRITIS TYPE: ICD-10-CM

## 2023-12-19 RX ORDER — PANTOPRAZOLE SODIUM 40 MG/1
40 TABLET, DELAYED RELEASE ORAL DAILY
Qty: 30 TABLET | Refills: 2 | Status: SHIPPED | OUTPATIENT
Start: 2023-12-19

## 2024-02-06 DIAGNOSIS — K29.71 GASTROINTESTINAL HEMORRHAGE ASSOCIATED WITH GASTRITIS, UNSPECIFIED GASTRITIS TYPE: ICD-10-CM

## 2024-02-08 RX ORDER — DICYCLOMINE HCL 20 MG
20 TABLET ORAL EVERY 6 HOURS
Qty: 90 TABLET | Refills: 3 | Status: SHIPPED | OUTPATIENT
Start: 2024-02-08

## 2024-02-08 RX ORDER — PANTOPRAZOLE SODIUM 40 MG/1
40 TABLET, DELAYED RELEASE ORAL DAILY
Qty: 30 TABLET | Refills: 2 | Status: SHIPPED | OUTPATIENT
Start: 2024-02-08

## (undated) DEVICE — SOL IRRG H2O PL/BG 1000ML STRL

## (undated) DEVICE — THE SINGLE USE ETRAP – POLYP TRAP IS USED FOR SUCTION RETRIEVAL OF ENDOSCOPICALLY REMOVED POLYPS.: Brand: ETRAP

## (undated) DEVICE — SINGLE-USE BIOPSY FORCEPS: Brand: RADIAL JAW 4

## (undated) DEVICE — SOLIDIFIER LIQLOC PLS 1500CC BT

## (undated) DEVICE — Device: Brand: DEFENDO AIR/WATER/SUCTION AND BIOPSY VALVE

## (undated) DEVICE — Device: Brand: SINGLE USE INJECTOR NM600/610

## (undated) DEVICE — SNAR POLYP CAPTIFLEX XS/OVL 11X2.4MM 240CM 1P/U

## (undated) DEVICE — COLON KIT: Brand: MEDLINE INDUSTRIES, INC.

## (undated) DEVICE — BLCK/BITE BLOX WO/DENTL/RIM W/STRAP 54F

## (undated) DEVICE — SNAR E/S POLYP SNAREMASTER OVL/10MM 2.8X2300MM YEL

## (undated) DEVICE — CONN JET HYDRA H20 AUXILIARY DISP

## (undated) DEVICE — LINER SURG CANSTR SXN S/RIGD 1500CC

## (undated) DEVICE — KT SYR GEL ORISE SNGL PK 10ML

## (undated) DEVICE — Device